# Patient Record
Sex: FEMALE | Race: WHITE | Employment: STUDENT | URBAN - NONMETROPOLITAN AREA
[De-identification: names, ages, dates, MRNs, and addresses within clinical notes are randomized per-mention and may not be internally consistent; named-entity substitution may affect disease eponyms.]

---
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
## 2019-03-02 ENCOUNTER — TRANSCRIBE ORDERS (OUTPATIENT)
Dept: ADMINISTRATIVE | Facility: HOSPITAL | Age: 19
End: 2019-03-02

## 2019-03-02 ENCOUNTER — LAB (OUTPATIENT)
Dept: LAB | Facility: HOSPITAL | Age: 19
End: 2019-03-02

## 2019-03-02 ENCOUNTER — NURSE TRIAGE (OUTPATIENT)
Dept: CALL CENTER | Facility: HOSPITAL | Age: 19
End: 2019-03-02

## 2019-03-02 DIAGNOSIS — R50.9 FEVER, UNSPECIFIED FEVER CAUSE: Primary | ICD-10-CM

## 2019-03-02 DIAGNOSIS — R50.9 FEVER, UNSPECIFIED FEVER CAUSE: ICD-10-CM

## 2019-03-02 LAB
FLUAV AG NPH QL: NEGATIVE
FLUBV AG NPH QL IA: NEGATIVE

## 2019-03-02 PROCEDURE — 87804 INFLUENZA ASSAY W/OPTIC: CPT

## 2019-03-02 NOTE — TELEPHONE ENCOUNTER
"She was seen by the [facility] today. Her medications were to be at the [facility] pharmacy. The medications are not there. She would like the medications sent to [facility].    Reason for Disposition  • [1] Prescription not at pharmacy AND [2] was prescribed today by PCP    Additional Information  • Negative: Drug overdose and nurse unable to answer question  • Negative: Caller requesting information not related to medicine  • Negative: Caller requesting a prescription for Strep throat and has a positive culture result  • Negative: Rash while taking a medication or within 3 days of stopping it  • Negative: Immunization reaction suspected  • Negative: [1] Asthma and [2] having symptoms of asthma (cough, wheezing, etc)  • Negative: MORE THAN A DOUBLE DOSE of a prescription or over-the-counter (OTC) drug  • Negative: [1] DOUBLE DOSE (an extra dose or lesser amount) of over-the-counter (OTC) drug AND [2] any symptoms (e.g., dizziness, nausea, pain, sleepiness)  • Negative: [1] DOUBLE DOSE (an extra dose or lesser amount) of prescription drug AND [2] any symptoms (e.g., dizziness, nausea, pain, sleepiness)  • Negative: Took another person's prescription drug  • Negative: [1] DOUBLE DOSE (an extra dose or lesser amount) of prescription drug AND [2] NO symptoms (Exception: a double dose of antibiotics)  • Negative: Diabetes drug error or overdose (e.g., insulin or extra dose)  • Negative: [1] Request for URGENT new prescription or refill of \"essential\" medication (i.e., likelihood of harm to patient if not taken) AND [2] triager unable to fill per unit policy  • Negative: Pharmacy calling with prescription questions and triager unable to answer question  • Negative: Caller has URGENT medication question about med that PCP prescribed and triager unable to answer question  • Negative: Caller has NON-URGENT medication question about med that PCP prescribed and triager unable to answer question  • Negative: Caller " "requesting a NON-URGENT new prescription or refill and triager unable to refill per unit policy  • Negative: Caller has medication question about med not prescribed by PCP and triager unable to answer question (e.g., compatibility with other med, storage)    Answer Assessment - Initial Assessment Questions  1. SYMPTOMS: \"Do you have any symptoms?\"      Her medications are not at the pharmacy.   2. SEVERITY: If symptoms are present, ask \"Are they mild, moderate or severe?\"      The medications are not at the pharmacy.    Protocols used: MEDICATION QUESTION CALL-ADULT-      "

## 2019-05-22 ENCOUNTER — TELEPHONE (OUTPATIENT)
Dept: FAMILY MEDICINE CLINIC | Age: 19
End: 2019-05-22

## 2019-05-22 NOTE — TELEPHONE ENCOUNTER
Patient left  requesting forms for work showing shes had a physical and a TB test. Left vm for patient to return call and schedule appt for physical and TB test because patient last physical and TB test was a year ago.

## 2019-05-31 ENCOUNTER — CLINICAL SUPPORT (OUTPATIENT)
Dept: RETAIL CLINIC | Facility: CLINIC | Age: 19
End: 2019-05-31

## 2019-05-31 ENCOUNTER — OFFICE VISIT (OUTPATIENT)
Dept: FAMILY MEDICINE CLINIC | Age: 19
End: 2019-05-31
Payer: COMMERCIAL

## 2019-05-31 VITALS
HEART RATE: 57 BPM | OXYGEN SATURATION: 99 % | TEMPERATURE: 98.6 F | DIASTOLIC BLOOD PRESSURE: 70 MMHG | BODY MASS INDEX: 28.47 KG/M2 | SYSTOLIC BLOOD PRESSURE: 120 MMHG | WEIGHT: 190 LBS

## 2019-05-31 DIAGNOSIS — F41.1 GENERALIZED ANXIETY DISORDER: ICD-10-CM

## 2019-05-31 DIAGNOSIS — Z00.00 ENCOUNTER FOR WELL ADULT EXAM WITHOUT ABNORMAL FINDINGS: Primary | ICD-10-CM

## 2019-05-31 DIAGNOSIS — N92.6 IRREGULAR MENSTRUAL CYCLE: ICD-10-CM

## 2019-05-31 DIAGNOSIS — Z11.1 VISIT FOR TB SKIN TEST: Primary | ICD-10-CM

## 2019-05-31 DIAGNOSIS — S93.402A SPRAIN OF LEFT ANKLE, UNSPECIFIED LIGAMENT, INITIAL ENCOUNTER: ICD-10-CM

## 2019-05-31 PROCEDURE — 99395 PREV VISIT EST AGE 18-39: CPT | Performed by: CLINICAL NURSE SPECIALIST

## 2019-05-31 PROCEDURE — 86580 TB INTRADERMAL TEST: CPT | Performed by: NURSE PRACTITIONER

## 2019-05-31 ASSESSMENT — ENCOUNTER SYMPTOMS
WHEEZING: 0
FACIAL SWELLING: 0
COLOR CHANGE: 0
SINUS PRESSURE: 0
CHEST TIGHTNESS: 0
SHORTNESS OF BREATH: 0
EYE DISCHARGE: 0
EYE PAIN: 0
ABDOMINAL PAIN: 0
COUGH: 0
SORE THROAT: 0
EYE REDNESS: 0
BACK PAIN: 0
TROUBLE SWALLOWING: 0
CONSTIPATION: 0
DIARRHEA: 0

## 2019-05-31 NOTE — PROGRESS NOTES
heard.  Pulmonary/Chest: Effort normal and breath sounds normal. No respiratory distress. She has no wheezes. She has no rales. Abdominal: Soft. Bowel sounds are normal. She exhibits no distension. There is no tenderness. Musculoskeletal: Normal range of motion. She exhibits no deformity. Left ankle: She exhibits swelling and ecchymosis. She exhibits normal range of motion, no deformity and normal pulse. Left foot: There is swelling. There is normal range of motion, no tenderness and no deformity. Lymphadenopathy:     She has no cervical adenopathy. Neurological: She is alert and oriented to person, place, and time. No cranial nerve deficit. Skin: Skin is warm and dry. No rash noted. No erythema. Psychiatric: She has a normal mood and affect. Thought content normal.   Vitals reviewed. ASSESSMENT/PLAN:  1. Encounter for well adult exam without abnormal findings  Immunizations UTD  Pap at 20    2. Irregular menstrual cycle  stable    3. Sprain of left ankle, unspecified ligament, initial encounter  No pinpoint pain and is bearing weight  Continue rest, elevation, NSAIDs and compression  To call if any changes for xray    4.  Generalized anxiety disorder  stable          Return in about 1 year (around 5/31/2020) for physical.

## 2019-05-31 NOTE — LETTER
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 There are no communicable diseases. She is up to date on immunizations. /70, Pulse 57, Temp 98.6, Weight 190lb, Height 5'9\"    If you have any questions or concerns, please don't hesitate to call.     Sincerely,        DESIRE Gage

## 2019-11-15 RX ORDER — DROSPIRENONE AND ETHINYL ESTRADIOL 0.02-3(28)
1 KIT ORAL DAILY
Qty: 28 TABLET | Refills: 1 | Status: SHIPPED | OUTPATIENT
Start: 2019-11-15 | End: 2019-12-19 | Stop reason: SDUPTHER

## 2019-12-19 ENCOUNTER — OFFICE VISIT (OUTPATIENT)
Dept: OBGYN | Age: 19
End: 2019-12-19
Payer: COMMERCIAL

## 2019-12-19 VITALS
WEIGHT: 189 LBS | HEIGHT: 69 IN | SYSTOLIC BLOOD PRESSURE: 130 MMHG | DIASTOLIC BLOOD PRESSURE: 77 MMHG | HEART RATE: 72 BPM | BODY MASS INDEX: 27.99 KG/M2

## 2019-12-19 DIAGNOSIS — Z11.3 SCREEN FOR STD (SEXUALLY TRANSMITTED DISEASE): ICD-10-CM

## 2019-12-19 DIAGNOSIS — Z30.41 ENCOUNTER FOR SURVEILLANCE OF CONTRACEPTIVE PILLS: Primary | ICD-10-CM

## 2019-12-19 PROCEDURE — 99213 OFFICE O/P EST LOW 20 MIN: CPT | Performed by: NURSE PRACTITIONER

## 2019-12-19 RX ORDER — DROSPIRENONE AND ETHINYL ESTRADIOL 0.02-3(28)
1 KIT ORAL DAILY
Qty: 28 TABLET | Refills: 1 | Status: SHIPPED | OUTPATIENT
Start: 2019-12-19 | End: 2020-01-09 | Stop reason: SDUPTHER

## 2019-12-20 ENCOUNTER — OFFICE VISIT (OUTPATIENT)
Dept: FAMILY MEDICINE CLINIC | Age: 19
End: 2019-12-20
Payer: COMMERCIAL

## 2019-12-20 VITALS
SYSTOLIC BLOOD PRESSURE: 118 MMHG | OXYGEN SATURATION: 99 % | HEART RATE: 72 BPM | WEIGHT: 190 LBS | DIASTOLIC BLOOD PRESSURE: 86 MMHG | TEMPERATURE: 97.1 F | BODY MASS INDEX: 28.06 KG/M2

## 2019-12-20 DIAGNOSIS — J06.9 BACTERIAL UPPER RESPIRATORY INFECTION: Primary | ICD-10-CM

## 2019-12-20 DIAGNOSIS — B96.89 BACTERIAL UPPER RESPIRATORY INFECTION: Primary | ICD-10-CM

## 2019-12-20 DIAGNOSIS — Z71.84 ENCOUNTER FOR COUNSELING FOR TRAVEL: ICD-10-CM

## 2019-12-20 PROCEDURE — 99213 OFFICE O/P EST LOW 20 MIN: CPT | Performed by: CLINICAL NURSE SPECIALIST

## 2019-12-20 RX ORDER — AZITHROMYCIN 250 MG/1
250 TABLET, FILM COATED ORAL SEE ADMIN INSTRUCTIONS
Qty: 6 TABLET | Refills: 0 | Status: SHIPPED | OUTPATIENT
Start: 2019-12-20 | End: 2019-12-25

## 2019-12-20 RX ORDER — METHYLPREDNISOLONE 4 MG/1
TABLET ORAL
Qty: 21 TABLET | Refills: 0 | Status: SHIPPED | OUTPATIENT
Start: 2019-12-20 | End: 2019-12-26

## 2019-12-20 ASSESSMENT — ENCOUNTER SYMPTOMS
CHEST TIGHTNESS: 1
SORE THROAT: 0
ALLERGIC/IMMUNOLOGIC NEGATIVE: 1
EYE DISCHARGE: 0
EYE PAIN: 0
FACIAL SWELLING: 0
NAUSEA: 0
WHEEZING: 0
RHINORRHEA: 0
CONSTIPATION: 0
GASTROINTESTINAL NEGATIVE: 1
RESPIRATORY NEGATIVE: 1
SINUS PRESSURE: 0
SHORTNESS OF BREATH: 0
EYES NEGATIVE: 1
DIARRHEA: 0
VOMITING: 0
COUGH: 1

## 2019-12-22 LAB
APTIMA MEDIA TYPE: NORMAL
CHLAMYDIA TRACHOMATIS AMPLIFIED DET: NEGATIVE
N GONORRHOEAE AMPLIFIED DET: NEGATIVE
SPECIMEN SOURCE: NORMAL

## 2020-01-08 ENCOUNTER — TELEPHONE (OUTPATIENT)
Dept: FAMILY MEDICINE CLINIC | Age: 20
End: 2020-01-08

## 2020-01-08 NOTE — TELEPHONE ENCOUNTER
Has she tried cutting in 1/2, if not would recommend 1/2 tablet up to three times daily as needed  If she has, I can send alternative

## 2020-01-09 RX ORDER — HYDROXYZINE PAMOATE 25 MG/1
25 CAPSULE ORAL 3 TIMES DAILY PRN
Qty: 60 CAPSULE | Refills: 0 | Status: SHIPPED | OUTPATIENT
Start: 2020-01-09 | End: 2022-01-18

## 2020-01-09 RX ORDER — DROSPIRENONE AND ETHINYL ESTRADIOL 0.02-3(28)
1 KIT ORAL DAILY
Qty: 28 TABLET | Refills: 1 | Status: SHIPPED | OUTPATIENT
Start: 2020-01-09 | End: 2020-04-28

## 2020-01-09 NOTE — TELEPHONE ENCOUNTER
Refill on OCP sent     For anxiety sent generic vistaril for prn use. This is really the only other thing for as needed use. If this does not work, will need to discuss other options in office.

## 2020-04-06 RX ORDER — FLUTICASONE PROPIONATE 50 MCG
2 SPRAY, SUSPENSION (ML) NASAL DAILY
Qty: 1 BOTTLE | Refills: 3 | Status: SHIPPED | OUTPATIENT
Start: 2020-04-06 | End: 2020-12-29

## 2020-04-28 ENCOUNTER — TELEMEDICINE (OUTPATIENT)
Dept: FAMILY MEDICINE CLINIC | Age: 20
End: 2020-04-28
Payer: COMMERCIAL

## 2020-04-28 ENCOUNTER — NURSE TRIAGE (OUTPATIENT)
Dept: OTHER | Facility: CLINIC | Age: 20
End: 2020-04-28

## 2020-04-28 VITALS
BODY MASS INDEX: 28.14 KG/M2 | DIASTOLIC BLOOD PRESSURE: 80 MMHG | WEIGHT: 190 LBS | SYSTOLIC BLOOD PRESSURE: 120 MMHG | HEIGHT: 69 IN | HEART RATE: 84 BPM

## 2020-04-28 PROCEDURE — 99213 OFFICE O/P EST LOW 20 MIN: CPT | Performed by: CLINICAL NURSE SPECIALIST

## 2020-04-28 RX ORDER — METHYLPREDNISOLONE 4 MG/1
TABLET ORAL
Qty: 21 TABLET | Refills: 0 | Status: SHIPPED | OUTPATIENT
Start: 2020-04-28 | End: 2020-05-04

## 2020-04-28 RX ORDER — AZITHROMYCIN 250 MG/1
TABLET, FILM COATED ORAL
Qty: 1 PACKET | Refills: 0 | Status: SHIPPED | OUTPATIENT
Start: 2020-04-28 | End: 2020-06-02

## 2020-04-28 ASSESSMENT — ENCOUNTER SYMPTOMS
SHORTNESS OF BREATH: 1
SORE THROAT: 0
EYE PAIN: 0
CHEST TIGHTNESS: 1
EYE DISCHARGE: 0
COUGH: 1
FACIAL SWELLING: 0
WHEEZING: 0
RHINORRHEA: 0
NAUSEA: 0
SINUS PRESSURE: 0
VOMITING: 0

## 2020-04-28 NOTE — PROGRESS NOTES
SUBJECTIVE:  Haroon Mckeon is a 23 y.o. who presents today for Cough      HPI    VIRTUAL VISIT VIA TELEPHONE    _______________________________________________________________    Due to COVID 23 outbreak, patient's office visit was converted to telephone virtual visit. Patient was contacted and agreed to proceed with a telephone virtual visit. The risks and benefits of converting to a telephone virtual visit were discussed in light of the current infectious disease epidemic. Patient also understood that insurance coverage and co-pays are up to their individual insurance plans. Katrin Smith presents today with increased cough and shortness of breath in the past week. She was diagnosed with asthma as a child and has done well, but she feels like since being in college, living in dorm with mold she has been more sick. She has albuterol rescue inhaler, but now feels like ineffective. In the past week worse. She has increased nasopharyngeal mucous and chest congestion. She is unable to expectorate any of this. No fever or chills. No known wheezing. She is taking regular antihistamine and nasal spray as well. Past Medical History:   Diagnosis Date    Asthma     Generalized anxiety disorder     Insomnia      No past surgical history on file.   Family History   Problem Relation Age of Onset    Diabetes Father     Heart Failure Maternal Grandmother     Heart Failure Maternal Grandfather     Heart Failure Paternal Grandmother     Stroke Paternal Grandmother     Diabetes Paternal Grandmother     Heart Failure Paternal Grandfather      Social History     Tobacco Use    Smoking status: Never Smoker    Smokeless tobacco: Never Used   Substance Use Topics    Alcohol use: No     Current Outpatient Medications   Medication Sig Dispense Refill    drospirenone-ethinyl estradiol (ELVIS) 3-0.02 MG per tablet Take 1 tablet by mouth daily 28 tablet 5    methylPREDNISolone (MEDROL DOSEPACK) 4 MG tablet Take by mouth. 21 tablet 0    albuterol-ipratropium (COMBIVENT RESPIMAT)  MCG/ACT AERS inhaler Inhale 1 puff into the lungs every 6 hours as needed for Wheezing or Shortness of Breath 4 g 5    azithromycin (ZITHROMAX) 250 MG tablet Take 2 tabs (500 mg) on Day 1, and take 1 tab (250 mg) on days 2 through 5. 1 packet 0    fluticasone (FLONASE) 50 MCG/ACT nasal spray 2 sprays by Nasal route daily 1 Bottle 3    hydrOXYzine (VISTARIL) 25 MG capsule Take 1 capsule by mouth 3 times daily as needed for Anxiety 60 capsule 0    cetirizine (ZYRTEC) 10 MG tablet Take 10 mg by mouth as needed for Allergies       No current facility-administered medications for this visit. No Known Allergies    Review of Systems   Constitutional: Negative for appetite change, chills, fatigue and fever. HENT: Positive for congestion and postnasal drip. Negative for ear discharge, ear pain, facial swelling, hearing loss, rhinorrhea, sinus pressure and sore throat. Eyes: Negative for pain, discharge and visual disturbance. Respiratory: Positive for cough, chest tightness and shortness of breath. Negative for wheezing. Cardiovascular: Negative for chest pain. Gastrointestinal: Negative for nausea and vomiting. Genitourinary: Negative for dysuria, frequency and urgency. Musculoskeletal: Negative for arthralgias and myalgias. Neurological: Negative for weakness, light-headedness and headaches. OBJECTIVE:  /80   Pulse 84   Ht 5' 9\" (1.753 m)   Wt 190 lb (86.2 kg)   BMI 28.06 kg/m²    Physical Exam  Vitals signs reviewed. Constitutional:       General: She is not in acute distress. Appearance: Normal appearance. She is not ill-appearing or toxic-appearing. HENT:      Nose: Congestion present. Eyes:      Conjunctiva/sclera: Conjunctivae normal.   Neck:      Musculoskeletal: Normal range of motion. Pulmonary:      Effort: Pulmonary effort is normal. No respiratory distress. Breath sounds:  No

## 2020-04-29 ENCOUNTER — TELEPHONE (OUTPATIENT)
Dept: FAMILY MEDICINE CLINIC | Age: 20
End: 2020-04-29

## 2020-04-29 RX ORDER — ALBUTEROL SULFATE 90 UG/1
2 AEROSOL, METERED RESPIRATORY (INHALATION) 4 TIMES DAILY PRN
Qty: 1 INHALER | Refills: 5 | Status: SHIPPED | OUTPATIENT
Start: 2020-04-29

## 2020-06-02 ENCOUNTER — OFFICE VISIT (OUTPATIENT)
Dept: FAMILY MEDICINE CLINIC | Age: 20
End: 2020-06-02
Payer: COMMERCIAL

## 2020-06-02 VITALS
RESPIRATION RATE: 18 BRPM | WEIGHT: 191 LBS | DIASTOLIC BLOOD PRESSURE: 74 MMHG | SYSTOLIC BLOOD PRESSURE: 116 MMHG | OXYGEN SATURATION: 98 % | HEIGHT: 68 IN | HEART RATE: 71 BPM | BODY MASS INDEX: 28.95 KG/M2 | TEMPERATURE: 97.4 F

## 2020-06-02 PROCEDURE — 99395 PREV VISIT EST AGE 18-39: CPT | Performed by: CLINICAL NURSE SPECIALIST

## 2020-06-03 ASSESSMENT — ENCOUNTER SYMPTOMS
BACK PAIN: 0
CONSTIPATION: 0
SORE THROAT: 0
EYE REDNESS: 0
ABDOMINAL DISTENTION: 0
ABDOMINAL PAIN: 0
WHEEZING: 0
SINUS PRESSURE: 0
CHEST TIGHTNESS: 0
EYE DISCHARGE: 0
FACIAL SWELLING: 0
TROUBLE SWALLOWING: 0
EYE PAIN: 0
SHORTNESS OF BREATH: 0
DIARRHEA: 1
COLOR CHANGE: 0
COUGH: 0

## 2020-07-22 ENCOUNTER — VIRTUAL VISIT (OUTPATIENT)
Dept: FAMILY MEDICINE CLINIC | Age: 20
End: 2020-07-22
Payer: COMMERCIAL

## 2020-07-22 PROCEDURE — 99214 OFFICE O/P EST MOD 30 MIN: CPT | Performed by: CLINICAL NURSE SPECIALIST

## 2020-07-22 ASSESSMENT — ENCOUNTER SYMPTOMS
ABDOMINAL PAIN: 0
EYE DISCHARGE: 0
EYE REDNESS: 0
CHEST TIGHTNESS: 0
TROUBLE SWALLOWING: 0
DIARRHEA: 0
CONSTIPATION: 0
FACIAL SWELLING: 0
WHEEZING: 0
BACK PAIN: 0
SHORTNESS OF BREATH: 0
EYE PAIN: 0
SORE THROAT: 0
COLOR CHANGE: 0
SINUS PRESSURE: 0
COUGH: 0

## 2020-07-22 NOTE — PROGRESS NOTES
SUBJECTIVE:  Armando Canales is a 23 y.o. who presents today for Contraception and Referral - General (ENT)      HPI    VIRTUAL VISIT VIA TELEPHONE    _______________________________________________________________    Due to COVID 23 outbreak, patient's office visit was converted to telephone virtual visit. Patient was contacted and agreed to proceed with a telephone virtual visit. The risks and benefits of converting to a telephone virtual visit were discussed in light of the current infectious disease epidemic. Patient also understood that insurance coverage and co-pays are up to their individual insurance plans. Camella Ganser was evaluated today via doxy. me for acute needs. She seems to have chronic, recurrent sinusitis. At first, seemed to be triggered by allergies to mold in her dorm at school, but now seems to also be with any weather pattern or season change. She uses steroid nasal spray and oral antihistamines, but really is interested in further evaluation and treatment of this. She prefers Dr Harpreet Morales at [facility]. Camella Ganser has had irregular menses as well as amenorrhea in the past.  She was started on OCP to help regulate this, but then went months without a period. After blood work, she was found to have elevated testosterone and was placed on irish per another provider/GYN. Since starting irish she has gained 30lb. She does not really feel like diet has changed and actually feels like overall has been more active. She tolerates the irish fine otherwise, but is interested in something with lower dosage hormones. Has not had a pap yet, but is scheduled in January with GYN    Past Medical History:   Diagnosis Date    Asthma     Generalized anxiety disorder     Insomnia      History reviewed. No pertinent surgical history.   Family History   Problem Relation Age of Onset    Diabetes Father     Heart Failure Maternal Grandmother     Heart Failure Maternal Grandfather     Heart Failure Paternal Grandmother     Stroke Paternal Grandmother     Diabetes Paternal Grandmother     Heart Failure Paternal Grandfather      Social History     Tobacco Use    Smoking status: Never Smoker    Smokeless tobacco: Never Used   Substance Use Topics    Alcohol use: No     Current Outpatient Medications   Medication Sig Dispense Refill    norethindrone-ethinyl estradiol-Fe (LO LOESTRIN FE) 1 MG-10 MCG / 10 MCG tablet Take 1 tablet by mouth daily 84 tablet 3    albuterol sulfate HFA (VENTOLIN HFA) 108 (90 Base) MCG/ACT inhaler Inhale 2 puffs into the lungs 4 times daily as needed for Wheezing 1 Inhaler 5    fluticasone (FLONASE) 50 MCG/ACT nasal spray 2 sprays by Nasal route daily 1 Bottle 3    hydrOXYzine (VISTARIL) 25 MG capsule Take 1 capsule by mouth 3 times daily as needed for Anxiety 60 capsule 0    cetirizine (ZYRTEC) 10 MG tablet Take 10 mg by mouth as needed for Allergies       No current facility-administered medications for this visit. No Known Allergies    Review of Systems   Constitutional: Positive for unexpected weight change. Negative for appetite change, chills, diaphoresis, fatigue and fever. HENT: Negative for congestion, ear pain, facial swelling, hearing loss, postnasal drip, sinus pressure, sore throat and trouble swallowing. Eyes: Negative for pain, discharge, redness and visual disturbance. Respiratory: Negative for cough, chest tightness, shortness of breath and wheezing. Cardiovascular: Negative for chest pain and palpitations. Gastrointestinal: Negative for abdominal pain, constipation and diarrhea. Genitourinary: Positive for menstrual problem. Negative for difficulty urinating, dysuria, flank pain, frequency, hematuria and urgency. Musculoskeletal: Negative for arthralgias, back pain, joint swelling and neck pain. Skin: Negative for color change and rash. Neurological: Negative for dizziness, syncope, weakness, light-headedness and headaches.    Hematological: Negative for adenopathy. Psychiatric/Behavioral: Negative for confusion, dysphoric mood and suicidal ideas. The patient is not nervous/anxious. OBJECTIVE:  Patient-Reported Vitals 7/22/2020   Patient-Reported Systolic 059   Patient-Reported Diastolic 59   Patient-Reported Pulse 96        There were no vitals taken for this visit. Physical Exam  Vitals signs reviewed. Constitutional:       General: She is not in acute distress. Appearance: She is not ill-appearing or toxic-appearing. HENT:      Head: Normocephalic. Nose: No congestion. Neck:      Musculoskeletal: Normal range of motion. Pulmonary:      Effort: Pulmonary effort is normal. No respiratory distress. Breath sounds: No wheezing. Neurological:      General: No focal deficit present. Mental Status: She is alert and oriented to person, place, and time. Mental status is at baseline. Psychiatric:         Mood and Affect: Mood normal.         Behavior: Behavior normal.         Thought Content: Thought content normal.         Judgment: Judgment normal.         ASSESSMENT/PLAN:  1. Irregular menstrual cycle  Controlled but side effects to irish including weight gain  She is coming up on the end of current pack, will start lo-estrin for next cycle  Advised of side effects  Pap smear as planned in January   - norethindrone-ethinyl estradiol-Fe (LO LOESTRIN FE) 1 MG-10 MCG / 10 MCG tablet; Take 1 tablet by mouth daily  Dispense: 84 tablet; Refill: 3    2. Recurrent sinusitis  - External Referral To ENT    Greater than 50% of 25 minute visit was spent in direct coordination with the patient for MDM. Return for already scheduled.

## 2020-07-30 ENCOUNTER — TELEPHONE (OUTPATIENT)
Dept: FAMILY MEDICINE CLINIC | Age: 20
End: 2020-07-30

## 2020-07-30 NOTE — TELEPHONE ENCOUNTER
She most likely does not need a referral.  I would have her check with [facility] to see if a therapist there can help her. She can call.

## 2020-07-30 NOTE — TELEPHONE ENCOUNTER
Patient is requesting to get a letter for an emotional support animal. Patient is not currently a patient with psych. Would you like to refer this patient? Please advise.

## 2020-08-05 ENCOUNTER — TRANSCRIBE ORDERS (OUTPATIENT)
Dept: ADMINISTRATIVE | Facility: HOSPITAL | Age: 20
End: 2020-08-05

## 2020-08-05 DIAGNOSIS — Z01.818 PREOP TESTING: Primary | ICD-10-CM

## 2020-08-28 ENCOUNTER — OFFICE VISIT (OUTPATIENT)
Dept: OTOLARYNGOLOGY | Facility: CLINIC | Age: 20
End: 2020-08-28

## 2020-08-28 VITALS
DIASTOLIC BLOOD PRESSURE: 83 MMHG | BODY MASS INDEX: 28.76 KG/M2 | WEIGHT: 194.2 LBS | SYSTOLIC BLOOD PRESSURE: 143 MMHG | HEART RATE: 69 BPM | HEIGHT: 69 IN | TEMPERATURE: 97.8 F

## 2020-08-28 DIAGNOSIS — J32.9 CHRONIC SINUSITIS, UNSPECIFIED LOCATION: ICD-10-CM

## 2020-08-28 DIAGNOSIS — J30.9 ALLERGIC RHINITIS, UNSPECIFIED SEASONALITY, UNSPECIFIED TRIGGER: Primary | ICD-10-CM

## 2020-08-28 DIAGNOSIS — J01.91 ACUTE RECURRENT SINUSITIS, UNSPECIFIED LOCATION: ICD-10-CM

## 2020-08-28 PROCEDURE — 99214 OFFICE O/P EST MOD 30 MIN: CPT | Performed by: PHYSICIAN ASSISTANT

## 2020-08-28 RX ORDER — OLOPATADINE HYDROCHLORIDE 665 UG/1
2 SPRAY NASAL 2 TIMES DAILY
Qty: 1 BOTTLE | Refills: 11 | Status: SHIPPED | OUTPATIENT
Start: 2020-08-28 | End: 2021-12-07

## 2020-08-28 RX ORDER — FLUTICASONE PROPIONATE 50 MCG
SPRAY, SUSPENSION (ML) NASAL
COMMUNITY
Start: 2020-08-11 | End: 2020-12-08

## 2020-08-28 RX ORDER — MONTELUKAST SODIUM 10 MG/1
10 TABLET ORAL DAILY
Qty: 30 TABLET | Refills: 11 | Status: SHIPPED | OUTPATIENT
Start: 2020-08-28 | End: 2021-12-07

## 2020-08-28 RX ORDER — ALBUTEROL SULFATE 90 UG/1
2 AEROSOL, METERED RESPIRATORY (INHALATION) EVERY 6 HOURS PRN
COMMUNITY
Start: 2020-04-29 | End: 2021-12-14 | Stop reason: SDUPTHER

## 2020-08-28 RX ORDER — HYDROXYZINE PAMOATE 25 MG/1
25 CAPSULE ORAL 3 TIMES DAILY PRN
COMMUNITY
Start: 2020-01-09 | End: 2021-08-12

## 2020-08-28 RX ORDER — CETIRIZINE HYDROCHLORIDE 10 MG/1
10 TABLET ORAL
COMMUNITY
End: 2020-08-28

## 2020-08-28 RX ORDER — NORETHINDRONE ACETATE AND ETHINYL ESTRADIOL, ETHINYL ESTRADIOL AND FERROUS FUMARATE 1MG-10(24)
1 KIT ORAL DAILY
COMMUNITY
Start: 2020-07-22 | End: 2022-08-17

## 2020-10-02 ENCOUNTER — PROCEDURE VISIT (OUTPATIENT)
Dept: OTOLARYNGOLOGY | Facility: CLINIC | Age: 20
End: 2020-10-02

## 2020-10-02 ENCOUNTER — LAB (OUTPATIENT)
Dept: LAB | Facility: HOSPITAL | Age: 20
End: 2020-10-02

## 2020-10-02 DIAGNOSIS — J30.9 ALLERGIC RHINITIS, UNSPECIFIED SEASONALITY, UNSPECIFIED TRIGGER: Primary | ICD-10-CM

## 2020-10-02 DIAGNOSIS — J30.9 ALLERGIC RHINITIS, UNSPECIFIED SEASONALITY, UNSPECIFIED TRIGGER: ICD-10-CM

## 2020-10-02 PROCEDURE — 86003 ALLG SPEC IGE CRUDE XTRC EA: CPT | Performed by: PHYSICIAN ASSISTANT

## 2020-10-02 PROCEDURE — 95004 PERQ TESTS W/ALRGNC XTRCS: CPT | Performed by: PHYSICIAN ASSISTANT

## 2020-10-02 PROCEDURE — 36415 COLL VENOUS BLD VENIPUNCTURE: CPT

## 2020-10-02 NOTE — PROGRESS NOTES
I have reviewed the notes, assessments, and/or procedures performed by Fransico Perkins, I concur with her/his documentation of Caprice Thibodeaux.

## 2020-10-02 NOTE — PROGRESS NOTES
Fransico Perkins   Allergy Testing Note:    Allergy Impact:  Allergy symptom severity: variable  Allergy symptom frequency: seasonal  Season allergy symptoms are worse: year round  Does allergy symptoms interfere with life?: moderately  Does allergy symptoms interfere with sleep?: a little      Allergy Symptoms:  Nasal symptoms: congestion  Ocular symptoms: itching;tearing  Ear symptoms: none  Throat symptoms: none  Mouth symptoms: none  Chest symptoms: asthma as a child      Environmental History:  Occupation: Student  Home environment: hardwood floor;plants  Tobacco use: none  Tobacco use: none  Animal exposures: cats;dogs (x 1 Cat indoor/x 1 Dog indoor)  Home heating: electric  Home cooling: central air      Allergy History:  Insect allergy reactions: bee  Previous allergy testing?: no  Previous immunotherapy?: no  Previous treatment in ER for allergic reaction?: no      Allergy Skin Testing:  Allergy testing was performed using the Modified Quantitative Technique. The procedure of allergy testing was explained to the patient including risks of itching burning and reactions both local and systemic. The patient understood and signed a consent. Alcohol prep was used to prepare the skin and a marking pen was used to label the Multi- Test and intradermal panels. Prick testing was performed on the forearms by using the Multitest applicator and applying gentle pressure. After 15 minutes the panels were read for reactions. Intradermal testing follow ups were then performed on the forearms. After 15 minutes, the panels were read for reactions. The results were explained to the patient and questions answered. No complications were noted.    Allergy Testing Results:  Consent: Y    Testing location: Arm    Allergen : Monreal    Testing Nurse/Tech: Fransico Perkins ST/AT    Reviewing Physician: David Snyder    Testing method: Skin Testing      Endpoints: 0=negative, higher number=higher reaction:  Vial: 3= sublingual  vial  Antigen Prick 5 2 EP VIAL    Histamine (!) 0       NEGATIVE HISTAMINE CONTROL     Glycerine Control 0          Eastern Tree Mix(T) 0        0        Black Dinosaur (T) 0       0        Bermuda Grass (G) 0       0        Narciso Grass (G) 0       0        [place] Bluegrass (G) 0       0        Ragweed Mix (W) 0       0        Common Weed (W) 0       0        Deisy Weed(W) 0       0        Mugwort/ Orestes (W) 0       0        Mold Mix (M) 0        0        Phycomycetes (M) 0       0        Fusarium (M) 0       0        Epicoccum (M) 0       0        Candida (M) 0       0        Trichophyton (M) 0       0        Phoma  (M) 0       0        Dust Mite Mix  0       0        Cockroach  0       0        Dog 0       0        Cat 0       0        Horse 0       0        Feather 0       0          Fransico RODRIGUEZ Perkins  10/2/2020  09:54 CDT     *Patient had negative Histamine response with all other antigen bring negative.  Proceeded with ImmnoCAP testing for environmental and standard food panels per Maxi Breen PA-C.

## 2020-10-05 LAB
A ALTERNATA IGE QN: <0.1 KU/L
A FUMIGATUS IGE QN: <0.1 KU/L
AMER ROACH IGE QN: <0.1 KU/L
BERMUDA GRASS IGE QN: <0.1 KU/L
BOXELDER IGE QN: <0.1 KU/L
C ALBICANS IGE QN: <0.1 KU/L
C HERBARUM IGE QN: <0.1 KU/L
CARELESS WEED IGE QN: <0.1 KU/L
CAT DANDER IGE QN: <0.1 KU/L
COCKLEBUR IGE QN: <0.1 KU/L
COMMON RAGWEED IGE QN: <0.1 KU/L
CONV CLASS DESCRIPTION: NORMAL
COW DANDER IGE QN: <0.1 KU/L
CURVULARIA IGE QN: <0.1 KU/L
D FARINAE IGE QN: <0.1 KU/L
D PTERONYSS IGE QN: <0.1 KU/L
DOG DANDER IGE QN: <0.1 KU/L
EAST WHITE PINE IGE QN: <0.1 KU/L
ENGL PLANTAIN IGE QN: <0.1 KU/L
GOOSE FEATHER IGE QN: <0.1 KU/L
GOOSEFOOT IGE QN: <0.1 KU/L
HORSE EPITH IGE QN: <0.1 KU/L
HOUSE DUST HS IGE QN: <0.1 KU/L
JOHNSON GRASS IGE QN: <0.1 KU/L
KENT BLUE GRASS IGE QN: <0.1 KU/L
LONDON PLANE IGE QN: <0.1 KU/L
MT JUNIPER IGE QN: <0.1 KU/L
P NOTATUM IGE QN: <0.1 KU/L
S ROSTRATA IGE QN: <0.1 KU/L
SHEEP SORREL IGE QN: <0.1 KU/L
VIRG LIVE OAK IGE QN: <0.1 KU/L
WHITE ASH IGE QN: <0.1 KU/L
WHITE ELM IGE QN: <0.1 KU/L
WHITE HICKORY IGE QN: <0.1 KU/L

## 2020-10-06 ENCOUNTER — TELEPHONE (OUTPATIENT)
Dept: OTOLARYNGOLOGY | Facility: CLINIC | Age: 20
End: 2020-10-06

## 2020-10-06 LAB
ALMOND IGE QN: <0.1 KU/L
BARLEY IGE QN: <0.1 KU/L
BEEF IGE QN: <0.1 KU/L
BRAZIL NUT IGE QN: <0.1 KU/L
CARROT IGE QN: <0.1 KU/L
CASHEW NUT IGE QN: <0.1 KU/L
CHICKEN MEAT IGE QN: <0.1 KU/L
COCONUT IGE QN: <0.1 KU/L
CODFISH IGE QN: <0.1 KU/L
CONV CLASS DESCRIPTION: NORMAL
CORN IGE QN: <0.1 KU/L
COW MILK IGE QN: <0.1 KU/L
CRAB IGE QN: <0.1 KU/L
EGG WHITE IGE QN: <0.1 KU/L
EGG YOLK IGE QN: <0.1 KU/L
GARLIC IGE QN: <0.1 KU/L
GLUTEN IGE QN: <0.1 KU/L
GOAT MILK IGE QN: <0.1 KU/L
HAZELNUT IGE QN: <0.1 KU/L
LOBSTER IGE QN: <0.1 KU/L
MACADAMIA IGE QN: <0.1 KU/L
ORANGE IGE QN: <0.1 KU/L
OYSTER IGE QN: <0.1 KU/L
PEA IGE QN: <0.1 KU/L
PEANUT IGE QN: <0.1 KU/L
PECAN/HICK NUT IGE QN: <0.1 KU/L
PISTACHIO IGE QN: <0.1 KU/L
PORK IGE QN: <0.1 KU/L
POTATO IGE QN: <0.1 KU/L
RICE IGE QN: <0.1 KU/L
RYE IGE QN: <0.1 KU/L
SALMON IGE QN: <0.1 KU/L
SCALLOP IGE QN: <0.1 KU/L
SESAME SEED IGE QN: <0.1 KU/L
SHRIMP IGE QN: <0.1 KU/L
SOYBEAN IGE QN: <0.1 KU/L
STRAWBERRY IGE QN: <0.1 KU/L
TOMATO IGE QN: <0.1 KU/L
TROUT IGE QN: <0.1 KU/L
TUNA IGE QN: <0.1 KU/L
WALNUT IGE QN: <0.1 KU/L
WHEAT IGE QN: <0.1 KU/L
WHITE BEAN IGE QN: <0.1 KU/L

## 2020-10-06 NOTE — TELEPHONE ENCOUNTER
Patient Notified    ----- Message from QUINN Girard sent at 10/6/2020 10:43 AM CDT -----  Please call the patient regarding her normal result.

## 2020-10-16 ENCOUNTER — OFFICE VISIT (OUTPATIENT)
Dept: OTOLARYNGOLOGY | Facility: CLINIC | Age: 20
End: 2020-10-16

## 2020-10-16 ENCOUNTER — TELEPHONE (OUTPATIENT)
Dept: OTOLARYNGOLOGY | Facility: CLINIC | Age: 20
End: 2020-10-16

## 2020-10-16 DIAGNOSIS — J30.9 ALLERGIC RHINITIS, UNSPECIFIED SEASONALITY, UNSPECIFIED TRIGGER: Primary | ICD-10-CM

## 2020-10-16 DIAGNOSIS — J32.9 CHRONIC SINUSITIS, UNSPECIFIED LOCATION: ICD-10-CM

## 2020-10-16 DIAGNOSIS — J01.91 ACUTE RECURRENT SINUSITIS, UNSPECIFIED LOCATION: ICD-10-CM

## 2020-10-16 PROCEDURE — 99421 OL DIG E/M SVC 5-10 MIN: CPT | Performed by: PHYSICIAN ASSISTANT

## 2020-10-16 NOTE — TELEPHONE ENCOUNTER
Called pt to reschedule allergy testing and follow up appt.  Advised pt to stop ALL meds 7 days prior to allergy testing except birth control and albuterol      ----- Message from QUINN Girard sent at 10/16/2020  1:54 PM CDT -----  NEED TO SCHEDULE REPEAT ALLERGY TESTING WITH SACHA WITH A FOLLOW-UP APPOINTMENT AFTER. SHE WAS TAKING THE PATANASE AND SINGULAIR PRIOR TO TESTING, SO FOR THE REPEAT TEST SHE WILL BE OFF OF ALL MEDICATIONS EXCEPT HER BIRTH CONTROL AND ALBUTEROL

## 2020-11-23 ENCOUNTER — PROCEDURE VISIT (OUTPATIENT)
Dept: OTOLARYNGOLOGY | Facility: CLINIC | Age: 20
End: 2020-11-23

## 2020-11-23 ENCOUNTER — HOSPITAL ENCOUNTER (OUTPATIENT)
Dept: CT IMAGING | Facility: HOSPITAL | Age: 20
Discharge: HOME | End: 2020-11-23
Admitting: PHYSICIAN ASSISTANT

## 2020-11-23 DIAGNOSIS — J01.91 ACUTE RECURRENT SINUSITIS, UNSPECIFIED LOCATION: ICD-10-CM

## 2020-11-23 DIAGNOSIS — J32.9 CHRONIC SINUSITIS, UNSPECIFIED LOCATION: ICD-10-CM

## 2020-11-23 DIAGNOSIS — J30.9 ALLERGIC RHINITIS, UNSPECIFIED SEASONALITY, UNSPECIFIED TRIGGER: ICD-10-CM

## 2020-11-23 DIAGNOSIS — J30.9 ALLERGIC RHINITIS, UNSPECIFIED SEASONALITY, UNSPECIFIED TRIGGER: Primary | ICD-10-CM

## 2020-11-23 PROCEDURE — 95024 IQ TESTS W/ALLERGENIC XTRCS: CPT | Performed by: NURSE PRACTITIONER

## 2020-11-23 PROCEDURE — 95004 PERQ TESTS W/ALRGNC XTRCS: CPT | Performed by: NURSE PRACTITIONER

## 2020-11-23 PROCEDURE — 70486 CT MAXILLOFACIAL W/O DYE: CPT

## 2020-11-23 NOTE — PROGRESS NOTES
Fransico Perkins   Allergy Testing Note:    Allergy Impact:  Allergy symptom severity: variable  Allergy symptom frequency: seasonal  Season allergy symptoms are worse: year round  Does allergy symptoms interfere with life?: moderately  Does allergy symptoms interfere with sleep?: a little      Allergy Symptoms:  Nasal symptoms: congestion  Ocular symptoms: itching;tearing  Ear symptoms: none  Throat symptoms: none  Mouth symptoms: none  Chest symptoms: asthma as a child      Environmental History:  Occupation: Student  Home environment: hardwood floor;plants  Tobacco use: none  Tobacco use: none  Animal exposures: cats;dogs (x 1 Cat/Indoor/x 1 Dog indoor)  Home heating: electric  Home cooling: central air      Allergy History:  Insect allergy reactions: bee  Previous allergy testing?: yes  When was previous allergy testing?: 10/2/2020 with Negative Histamine and proceeded with ImmunoCap testing with negative results.  Previous allergy doctor: David Snyder  Previous immunotherapy?: no  Previous treatment in ER for allergic reaction?: no      Allergy Skin Testing:  Allergy testing was performed using the Modified Quantitative Technique. The procedure of allergy testing was explained to the patient including risks of itching burning and reactions both local and systemic. The patient understood and signed a consent. Alcohol prep was used to prepare the skin and a marking pen was used to label the Multi- Test and intradermal panels. Prick testing was performed on the forearms by using the Multitest applicator and applying gentle pressure. After 15 minutes the panels were read for reactions. Intradermal testing follow ups were then performed on the forearms. After 15 minutes, the panels were read for reactions. The results were explained to the patient and questions answered. No complications were noted.    Allergy Testing Results:  Consent: Y    Testing location: Arm    Allergen : Monreal    Testing Nurse/Tech:  Fransico Perkins ST/AT    Reviewing Physician: David Snyder    Testing method: Skin Testing      Endpoints: 0=negative, higher number=higher reaction:  Vial: 3= sublingual vial  Antigen Prick 5 2 EP VIAL    Histamine 7       normal controls     Glycerine Control 0          Eastern Tree Mix(T) 0      7   3        [place] (T) 0     7   3        Berm[place] Grass (G) 0     6   0        Narciso Grass (G) 0     7   3        [place] Bluegrass (G) 0     7   3        Ragweed Mix (W) 0     7   3        Common Weed (W) 0     6   0        Deisy Weed(W) 0     6   0        Mugwort/ Orestes (W) 0     6   0        Mold Mix (M) 0      9   3        Phycomycetes (M) 0     7   3        Fusarium (M) 0     9   3        Epicoccum (M) 0     6   0        Candida (M) 0     11   3        Trichophyton (M) 0     9   3        Phoma  (M) 0     7   3        Dust Mite Mix  0     11   3        Cockroach  0     6   0        Dog 0     7   3        Cat 0     6   0        Horse 0     6   0        Feather 0     6   0          Fransico Perkins  11/23/2020  14:53 CST

## 2020-11-30 ENCOUNTER — TELEPHONE (OUTPATIENT)
Dept: OTOLARYNGOLOGY | Facility: CLINIC | Age: 20
End: 2020-11-30

## 2020-11-30 NOTE — TELEPHONE ENCOUNTER
----- Message from QUINN Girard sent at 11/25/2020  3:02 PM CST -----  Please call the patient regarding her abnormal result. Mucosal thickening in all sinuses with worst area being in the ethmoid sinuses, right deviation and septal spur, bilateral bib bullosa, hypertrophy of inferior turbinates, narrow bilateral OMC's. Allergy testing was mild. Follow-up to discuss sinus surgery.

## 2020-12-01 ENCOUNTER — TRANSCRIBE ORDERS (OUTPATIENT)
Dept: ADMINISTRATIVE | Facility: HOSPITAL | Age: 20
End: 2020-12-01

## 2020-12-01 DIAGNOSIS — Z01.818 PRE-OP TESTING: Primary | ICD-10-CM

## 2020-12-07 NOTE — PROGRESS NOTES
YOB: 2000  Location: [facility]  Location Address: [address] [place] [address],  [address], [address] [place] [address]  Location Phone: [phone]    Chief Complaint   Patient presents with   • Follow-up       History of Present Illness  Caprice Thibodeaux is a  20 y.o.  female who complains of nasal congestion, nasal drainage, repeated sinusitis, sinus pressure, postnasal drip, allergy, facial pressure and headaches. The symptoms are localized to the bilateral nose. The patient has had moderate symptoms. The symptoms have been chronically occuring with acute flair ups occurring several times for the last year for the last several years. The symptoms are aggravated by  allergy and weather change. The symptoms are improved by no identifiable factors.The patient has continued sinus symptoms with continued recurrent sinus infections despite being treated with antihistamines, several rounds of antibiotics, and daily use of Flonase, Patanase, and Singulair. Patient has had recent allergy testing and CT sinus.     I have personally reviewed the information imported into the chart during this visit.      I have personally reviewed the review of systems.               Histamine 7            normal controls      Glycerine Control 0                Eastern Tree Mix(T) 0     7    3         Black Grubbs (T) 0     7    3         Bermuda Grass (G) 0     6    0         Narciso Grass (G) 0     7    3         [place] Bluegrass (G) 0     7    3         Ragweed Mix (W) 0     7    3         Common Weed (W) 0     6    0         Deisy Weed(W) 0     6    0         Mugwort/ Orestes (W) 0     6    0         Mold Mix (M) 0     9    3         Phycomycetes (M) 0     7    3         Fusarium (M) 0     9    3         Epicoccum (M) 0     6    0         Candida (M) 0     11    3         Trichophyton (M) 0     9    3         Phoma  (M) 0     7    3         Dust Mite Mix  0     11    3         Cockroach  0     6    0         Dog 0     7    3         Cat 0      6    0         Horse 0     6    0         Feather 0     6    0            Study Result    CT SINUS WO CONTRAST- 11/23/2020 4:05 PM CST     HISTORY: chronic recurrent acute sinusitis despite treatment, imaging  for surgical planning; J30.9-Allergic rhinitis, unspecified;  J32.9-Chronic sinusitis, unspecified; J01.91-Acute recurrent sinusitis,  unspecified      COMPARISON: None     DOSE LENGTH PRODUCT: 320 mGy cm. Automated exposure control was also  utilized to decrease patient radiation dose.     TECHNIQUE: Axial images of the paranasal sinuses are obtained with  sagittal coronal reconstructions.     FINDINGS:  There is chronic mucosal thickening of the paranasal sinuses  with greatest involvement of the ethmoid sinuses. No air-fluid levels  are localized. No destructive bony changes. Minimal right-sided  deviation nasal septum. Orbital globes intact. No retrobulbar pathology.  Mastoid air cells are well-aerated. No fluid identified in the middle  ear.     The estimated complexes are mildly narrowed but patent.     IMPRESSION:  1. Chronic mucosal thickening of paranasal sinuses with greatest  involvement of the ethmoid sinuses. Mild narrowing of the ostiomeatal  complexes.  This report was finalized on 11/23/2020 16:48 by Dr. Jennifer Olson MD.                    Past Medical History:   Diagnosis Date   • Asthma    • Sinusitis        Past Surgical History:   Procedure Laterality Date   • WISDOM TOOTH EXTRACTION         Outpatient Medications Marked as Taking for the 12/8/20 encounter (Office Visit) with David Snyder MD   Medication Sig Dispense Refill   • albuterol sulfate  (90 Base) MCG/ACT inhaler Inhale 2 puffs As Needed.     • hydrOXYzine pamoate (VISTARIL) 25 MG capsule Take 25 mg by mouth As Needed.     • LO LOESTRIN FE 1 MG-10 MCG / 10 MCG tablet      • olopatadine (PATANASE) 0.6 % solution nasal solution 2 sprays by Each Nare route 2 (Two) Times a Day. 1 bottle 11   • [DISCONTINUED]  fluticasone (FLONASE) 50 MCG/ACT nasal spray          Patient has no known allergies.    Family History   Problem Relation Age of Onset   • Hyperlipidemia Mother    • Diabetes Father    • Hypertension Father        Social History     Socioeconomic History   • Marital status: Single     Spouse name: Not on file   • Number of children: Not on file   • Years of education: Not on file   • Highest education level: Not on file   Tobacco Use   • Smoking status: Never Smoker   • Smokeless tobacco: Never Used   Substance and Sexual Activity   • Alcohol use: Not Currently     Comment: mainly on weekends   • Drug use: Never       Review of Systems   Constitutional: Negative.    HENT: Positive for congestion, postnasal drip, rhinorrhea and sinus pressure.    Eyes: Negative.    Respiratory: Negative.    Cardiovascular: Negative.    Gastrointestinal: Negative.    Endocrine: Negative.    Genitourinary: Negative.    Musculoskeletal: Negative.    Skin: Negative.    Allergic/Immunologic: Positive for environmental allergies.   Neurological: Positive for headaches.   Hematological: Negative.    Psychiatric/Behavioral: Negative.        Vitals:    12/08/20 1616   BP: 144/86   Pulse: 73   Temp: 98.7 °F (37.1 °C)       There is no height or weight on file to calculate BMI.    Objective     Physical Exam  Physical Exam  CONSTITUTIONAL: well nourished, alert, oriented, in no acute distress      COMMUNICATION AND VOICE: able to communicate normally, normal voice quality     HEAD: normocephalic, no lesions, atraumatic, no tenderness, no masses      FACE: appearance normal, no lesions, no tenderness, no deformities, facial motion symmetric     SALIVARY GLANDS: parotid glands with no tenderness, no swelling, no masses, submandibular glands with normal size, nontender     EYES: ocular motility normal, eyelids normal, orbits normal, no proptosis, conjunctiva normal , pupils equal, round      EARS:  Hearing: response to conversational voice normal  bilaterally   External Ears: auricles without lesions  Otoscopic: tympanic membrane appearance normal, no lesions, no perforation, normal mobility, no fluid     NOSE:  External Nose: structure normal, no tenderness on palpation, no nasal discharge, no lesions, no evidence of trauma, nostrils patent   Intranasal Exam: See endoscopy  Nasopharynx:      ORAL:  Lips: upper and lower lips without lesion   Teeth: dentition within normal limits for age   Gums: gingivae healthy   Oral Mucosa: oral mucosa normal, no mucosal lesions   Floor of Mouth: Warthin’s duct patent, mucosa normal  Tongue: lingual mucosa normal without lesions, normal tongue mobility   Palate: soft and hard palates with normal mucosa and structure  Oropharynx: oropharyngeal mucosa normal     HYPOPHARYNX:   LARYNX:       NECK: neck appearance normal, no mass,  noted without erythema or tenderness     THYROID: no overt thyromegaly, no tenderness, nodules or mass present on palpation, position midline      LYMPH NODES: no lymphadenopathy     CHEST/RESPIRATORY: respiratory effort normaL     CARDIOVASCULAR: extremities without cyanosis or edema       NEUROLOGIC/PSYCHIATRIC: oriented to time, place and person, mood normal, affect appropriate, CN II-XII intact grossly    Assessment/Plan   Diagnoses and all orders for this visit:    1. Chronic pansinusitis (Primary)  -     Case Request; Standing  -     Comprehensive Metabolic Panel; Future  -     CBC (No Diff); Future  -     APTT; Future  -     Case Request    2. Nasal septal deviation  Comments:  Right side with spurring  Orders:  -     Case Request; Standing  -     Comprehensive Metabolic Panel; Future  -     CBC (No Diff); Future  -     APTT; Future  -     Case Request    3. Tatiana bullosa  Comments:  Bilateral  Orders:  -     Case Request; Standing  -     Comprehensive Metabolic Panel; Future  -     CBC (No Diff); Future  -     APTT; Future  -     Case Request    4. Hypertrophy of both inferior nasal  turbinates  -     Case Request; Standing  -     Comprehensive Metabolic Panel; Future  -     CBC (No Diff); Future  -     APTT; Future  -     Case Request    5. Seasonal allergic rhinitis due to pollen  -     Case Request; Standing  -     Comprehensive Metabolic Panel; Future  -     CBC (No Diff); Future  -     APTT; Future  -     Case Request    Other orders  -     Follow Anesthesia Guidelines / Standing Orders; Future  -     Obtain Informed Consent  -     Follow Anesthesia Guidelines / Standing Orders; Standing  -     Verify NPO Status; Standing  -     NPO Diet; Standing  -     Obtain Informed Consent; Standing  -     SCD (Sequential Compression Device) - To Be Placed on Patient in Pre-Op; Standing      ENDOSCOPIC FUNCTIONAL SINUS SURGERY W TRACKING, BILATERAL KAYLEN BULLOSA RESECTION, SEPTOPLASTY, RESECT INFERIOR TURBINATES VIA COBLATION AND POSTERIOR NERVE ABLATION VIA CLARIFIX (Bilateral)  Orders Placed This Encounter   Procedures   • Comprehensive Metabolic Panel     Standing Status:   Future     Standing Expiration Date:   12/8/2021   • CBC (No Diff)     Standing Status:   Future     Standing Expiration Date:   12/8/2021   • APTT     Standing Status:   Future     Standing Expiration Date:   12/8/2021   • Follow Anesthesia Guidelines / Standing Orders     Standing Status:   Future   • Obtain Informed Consent     Order Specific Question:   Informed Consent Given For     Answer:   ENDOSCOPIC FUNCTIONAL SINUS SURGERY W TRACKING, SINUPLASTY, SEPTOPLASTY, RESECT INFERIOR TURBINATES VIA COBLATION     Return for postop.       Patient Instructions   Risk benefits and options of a milk and dairy free diet associated with continuation of intranasal steroid sprays and antihistamine nasal sprays was discussed  The option of consideration of endonasal sinus surgery was also discussed and the patient as well as her mom opted to proceed with surgery.    FUNCTIONAL ENDOSCOPIC SINUS SURGERY WITH BILATERAL KAYLEN BULLOSA  RESECTION: A functional endoscopic sinus surgery was recommended. The risks and benefits were explained including but not limited to pain, bleeding (with the possible need for nasal packing), infection, risks of the general anesthesia, orbital injury with blurred vision or visual loss, cerebrospinal fluid leak, persistent disease, scarring, synichiae and the possibility for the need of reoperation. Possibilities of additional sinus work or less sinus work depending on the status of the nose at the time of the operation was discussed. Alternatives were discussed. No guarantees were made or implied. Questions were asked appropriately answered.    SEPTOPLASTY AND TURBINOPLASTY/CLARIFIX: A septoplasty and inferior turbinoplasty were recommended. The risks and benefits were explained including but not limited to pain, bleeding, infection, risks of the general anesthesia, continued septal deviation, crusting, congestion and septal perforation. Possibilities of continued preoperative symptoms and the possible need for revision surgery and or medical therapy were discussed. Alternatives were discussed. No guarantees were made or implied. Questions were asked appropriately answered.

## 2020-12-08 ENCOUNTER — OFFICE VISIT (OUTPATIENT)
Dept: OTOLARYNGOLOGY | Facility: CLINIC | Age: 20
End: 2020-12-08

## 2020-12-08 VITALS — SYSTOLIC BLOOD PRESSURE: 144 MMHG | TEMPERATURE: 98.7 F | HEART RATE: 73 BPM | DIASTOLIC BLOOD PRESSURE: 86 MMHG

## 2020-12-08 DIAGNOSIS — J34.2 NASAL SEPTAL DEVIATION: ICD-10-CM

## 2020-12-08 DIAGNOSIS — J34.3 HYPERTROPHY OF BOTH INFERIOR NASAL TURBINATES: ICD-10-CM

## 2020-12-08 DIAGNOSIS — J30.1 SEASONAL ALLERGIC RHINITIS DUE TO POLLEN: ICD-10-CM

## 2020-12-08 DIAGNOSIS — J32.4 CHRONIC PANSINUSITIS: Primary | ICD-10-CM

## 2020-12-08 DIAGNOSIS — J34.89 CONCHA BULLOSA: Chronic | ICD-10-CM

## 2020-12-08 PROCEDURE — 99214 OFFICE O/P EST MOD 30 MIN: CPT | Performed by: OTOLARYNGOLOGY

## 2020-12-08 PROCEDURE — 31231 NASAL ENDOSCOPY DX: CPT | Performed by: OTOLARYNGOLOGY

## 2020-12-08 NOTE — PROGRESS NOTES
PROCEDURE NOTE    Caprice Thibodeaux    DATE OF PROCEDURE: 12/8/2020    PROCEDURE:   Bilateral Diagnostic Rigid Nasal Endoscopy    PREPROCEDURE DIAGNOSIS:   Bilateral chronic pansinusitis bib bullosa deformity and septal deviation    POSTPROCEDURE DIAGNOSIS:  SAME    ANESTHESIA:   None    PROCEDURE DESCRIPTION:    With the patient in the chair bilateral diagnostic rigid nasal endoscopy was performed with the Stortz 0° endoscope without difficulty.     An endoscope was passed along the left nasal floor to the nasopharynx.  It was then passed into the region of the middle meatus, middle turbinate, and the sphenoethmoid region. An identical procedure was performed on the right side.  The following findings were noted as stated below:    Findings: Moderate to severe inferior turbinate hypertrophy bilaterally with right septal deviation and inferior septal spurring.  Bilateral bib bullosa deformity is noted with thickened secretions and narrow middle meatus bilaterally.  No polyposis is noted    Condition:  Stable.  Patient tolerated procedure well.    Complications:  None  There was no significant bleeding.

## 2020-12-08 NOTE — PATIENT INSTRUCTIONS
Risk benefits and options of a milk and dairy free diet associated with continuation of intranasal steroid sprays and antihistamine nasal sprays was discussed  The option of consideration of endonasal sinus surgery was also discussed and the patient as well as her mom opted to proceed with surgery.    FUNCTIONAL ENDOSCOPIC SINUS SURGERY WITH BILATERAL KAYLEN BULLOSA RESECTION: A functional endoscopic sinus surgery was recommended. The risks and benefits were explained including but not limited to pain, bleeding (with the possible need for nasal packing), infection, risks of the general anesthesia, orbital injury with blurred vision or visual loss, cerebrospinal fluid leak, persistent disease, scarring, synichiae and the possibility for the need of reoperation. Possibilities of additional sinus work or less sinus work depending on the status of the nose at the time of the operation was discussed. Alternatives were discussed. No guarantees were made or implied. Questions were asked appropriately answered.    SEPTOPLASTY AND TURBINOPLASTY/CLARIFIX: A septoplasty and inferior turbinoplasty were recommended. The risks and benefits were explained including but not limited to pain, bleeding, infection, risks of the general anesthesia, continued septal deviation, crusting, congestion and septal perforation. Possibilities of continued preoperative symptoms and the possible need for revision surgery and or medical therapy were discussed. Alternatives were discussed. No guarantees were made or implied. Questions were asked appropriately answered.

## 2020-12-11 PROBLEM — J30.1 SEASONAL ALLERGIC RHINITIS DUE TO POLLEN: Status: ACTIVE | Noted: 2020-12-11

## 2020-12-21 ENCOUNTER — TRANSCRIBE ORDERS (OUTPATIENT)
Dept: ADMINISTRATIVE | Facility: HOSPITAL | Age: 20
End: 2020-12-21

## 2020-12-21 DIAGNOSIS — Z11.59 SCREENING FOR VIRAL DISEASE: Primary | ICD-10-CM

## 2020-12-23 ENCOUNTER — APPOINTMENT (OUTPATIENT)
Dept: PREADMISSION TESTING | Facility: HOSPITAL | Age: 20
End: 2020-12-23

## 2020-12-23 VITALS
RESPIRATION RATE: 16 BRPM | HEIGHT: 69 IN | HEART RATE: 52 BPM | BODY MASS INDEX: 29.06 KG/M2 | DIASTOLIC BLOOD PRESSURE: 64 MMHG | OXYGEN SATURATION: 99 % | WEIGHT: 196.21 LBS | SYSTOLIC BLOOD PRESSURE: 135 MMHG

## 2020-12-23 DIAGNOSIS — J34.2 NASAL SEPTAL DEVIATION: ICD-10-CM

## 2020-12-23 DIAGNOSIS — J30.1 SEASONAL ALLERGIC RHINITIS DUE TO POLLEN: ICD-10-CM

## 2020-12-23 DIAGNOSIS — J32.4 CHRONIC PANSINUSITIS: ICD-10-CM

## 2020-12-23 DIAGNOSIS — J34.89 CONCHA BULLOSA: Chronic | ICD-10-CM

## 2020-12-23 DIAGNOSIS — J34.3 HYPERTROPHY OF BOTH INFERIOR NASAL TURBINATES: ICD-10-CM

## 2020-12-23 LAB
ALBUMIN SERPL-MCNC: 4.2 G/DL (ref 3.5–5.2)
ALBUMIN/GLOB SERPL: 1.4 G/DL
ALP SERPL-CCNC: 94 U/L (ref 39–117)
ALT SERPL W P-5'-P-CCNC: 39 U/L (ref 1–33)
ANION GAP SERPL CALCULATED.3IONS-SCNC: 11 MMOL/L (ref 5–15)
APTT PPP: 28.6 SECONDS (ref 24.1–35)
AST SERPL-CCNC: 33 U/L (ref 1–32)
BILIRUB SERPL-MCNC: 0.3 MG/DL (ref 0–1.2)
BUN SERPL-MCNC: 8 MG/DL (ref 6–20)
BUN/CREAT SERPL: 12.7 (ref 7–25)
CALCIUM SPEC-SCNC: 8.9 MG/DL (ref 8.6–10.5)
CHLORIDE SERPL-SCNC: 104 MMOL/L (ref 98–107)
CO2 SERPL-SCNC: 24 MMOL/L (ref 22–29)
CREAT SERPL-MCNC: 0.63 MG/DL (ref 0.57–1)
DEPRECATED RDW RBC AUTO: 38.6 FL (ref 37–54)
ERYTHROCYTE [DISTWIDTH] IN BLOOD BY AUTOMATED COUNT: 12.8 % (ref 12.3–15.4)
GFR SERPL CREATININE-BSD FRML MDRD: 119 ML/MIN/1.73
GLOBULIN UR ELPH-MCNC: 3.1 GM/DL
GLUCOSE SERPL-MCNC: 98 MG/DL (ref 65–99)
HCT VFR BLD AUTO: 40.6 % (ref 34–46.6)
HGB BLD-MCNC: 13.8 G/DL (ref 12–15.9)
MCH RBC QN AUTO: 28.2 PG (ref 26.6–33)
MCHC RBC AUTO-ENTMCNC: 34 G/DL (ref 31.5–35.7)
MCV RBC AUTO: 83 FL (ref 79–97)
PLATELET # BLD AUTO: 262 10*3/MM3 (ref 140–450)
PMV BLD AUTO: 12 FL (ref 6–12)
POTASSIUM SERPL-SCNC: 3.8 MMOL/L (ref 3.5–5.2)
PROT SERPL-MCNC: 7.3 G/DL (ref 6–8.5)
RBC # BLD AUTO: 4.89 10*6/MM3 (ref 3.77–5.28)
SODIUM SERPL-SCNC: 139 MMOL/L (ref 136–145)
WBC # BLD AUTO: 9 10*3/MM3 (ref 3.4–10.8)

## 2020-12-23 PROCEDURE — 80053 COMPREHEN METABOLIC PANEL: CPT

## 2020-12-23 PROCEDURE — 85730 THROMBOPLASTIN TIME PARTIAL: CPT

## 2020-12-23 PROCEDURE — 36415 COLL VENOUS BLD VENIPUNCTURE: CPT

## 2020-12-23 PROCEDURE — 85027 COMPLETE CBC AUTOMATED: CPT

## 2020-12-23 NOTE — DISCHARGE INSTRUCTIONS
DAY OF SURGERY INSTRUCTIONS        YOUR SURGEON: Dr. Snyder    PROCEDURE: Endoscopic Functional Sinus surgery with tracking, bilateral bib bullosa resection, septoplasty, resect inferior turbinates via coblation and posterior nerve ablation via clarifix    DATE OF SURGERY: December 30, 2020    ARRIVAL TIME: AS DIRECTED BY OFFICE    YOU MAY TAKE THE FOLLOWING MEDICATION(S) THE MORNING OF SURGERY WITH A SIP OF WATER: vistaril    ALL OTHER HOME MEDICATIONS CHECK WITH YOUR DOCTOR    DO NOT TAKE ANY ERECTILE DYSFUNCTION MEDICATIONS (EX:  CIALIS, VIAGRA) 24 HOURS PRIOR TO SURGERY              MANAGING PAIN AFTER SURGERY    We know you are probably wondering what your pain will be like after surgery.  Following surgery it is unrealistic to expect you will not have pain.   Pain is how our bodies let us know that something is wrong or cautions us to be careful.  That said, our goal is to make your pain tolerable.    Methods we may use to treat your pain include (oral or IV medications, PCAs, epidurals, nerve blocks, etc.)   While some procedures require IV pain medications for a short time after surgery, transitioning to pain medications by mouth allows for better management of pain.   Your nurse will encourage you to take oral pain medications whenever possible.  IV medications work almost immediately, but only last a short while.  Taking medications by mouth allows for a more constant level of medication in your blood stream for a longer period of time.      Once your pain is out of control it is harder to get back under control.  It is important you are aware when your next dose of pain medication is due.  If you are admitted, your nurse may write the time of your next dose on the white board in your room to help you remember.      We are interested in your pain and encourage you to inform us about aggravating factors during your visit.   Many times a simple repositioning every few hours can make a big difference.    If  your physician says it is okay, do not let your pain prevent you from getting out of bed. Be sure to call your nurse for assistance prior to getting up so you do not fall.      Before surgery, please decide your tolerable pain goal.  These faces help describe the pain ratings we use on a 0-10 scale.   Be prepared to tell us your goal and whether or not you take pain or anxiety medications at home.      BEFORE YOU COME TO THE HOSPITAL  (Pre-op instructions)  • Do not eat, drink, smoke or chew gum after midnight the night before surgery.  This also includes no mints.  • Morning of surgery take only the medicines you have been instructed with a sip of water unless otherwise instructed  by your physician.  • Do not shave, wear makeup or dark nail polish.  • Remove all jewelry including rings.  • Leave anything you consider valuable at home.  • Leave your suitcase in the car until after your surgery.  • Bring the following with you if applicable:  o Picture ID and insurance, Medicare or Medicaid cards  o Co-pay/deductible required by insurance (cash, check, credit card)  o Copy of advance directive, living will or power-of- documents if not brought to PAT  o CPAP or BIPAP mask and tubing  o Relaxation aids ( book, magazine), etc.  o Hearing aids                                 ON THE DAY OF SURGERY  · On the day of surgery check in at registration located at the main entrance of the hospital.   ? You will be registered and given a beeper with instructions where to wait in the main lobby.  ? When your beeper lights up and vibrates a member of the Outpatient Surgery staff will meet you at the double doors under the stair steps and escort you to your preoperative room.   · You may have cloth compression devices placed on your legs. These help to prevent blood clots and reduce swelling in your legs.  · An IV may be inserted into one of your veins.  · In the operating room, you may be given one or more of the  "following:  ? A medicine to help you relax (sedative).  ? A medicine to numb the area (local anesthetic).  ? A medicine to make you fall asleep (general anesthetic).  ? A medicine that is injected into an area of your body to numb everything below the injection site (regional anesthetic).  · Your surgical site will be marked or identified.  · You may be given an antibiotic through your IV to help prevent infection.  Contact a health care provider if you:  · Develop a fever of more than 100.4°F (38°C) or other feelings of illness during the 48 hours before your surgery.  · Have symptoms that get worse.  Have questions or concerns about your surgery    General Anesthesia/Surgery, Adult  General anesthesia is the use of medicines to make a person \"go to sleep\" (unconscious) for a medical procedure. General anesthesia must be used for certain procedures, and is often recommended for procedures that:  · Last a long time.  · Require you to be still or in an unusual position.  · Are major and can cause blood loss.  The medicines used for general anesthesia are called general anesthetics. As well as making you unconscious for a certain amount of time, these medicines:  · Prevent pain.  · Control your blood pressure.  · Relax your muscles.  Tell a health care provider about:  · Any allergies you have.  · All medicines you are taking, including vitamins, herbs, eye drops, creams, and over-the-counter medicines.  · Any problems you or family members have had with anesthetic medicines.  · Types of anesthetics you have had in the past.  · Any blood disorders you have.  · Any surgeries you have had.  · Any medical conditions you have.  · Any recent upper respiratory, chest, or ear infections.  · Any history of:  ? Heart or lung conditions, such as heart failure, sleep apnea, asthma, or chronic obstructive pulmonary disease (COPD).  ?  service.  ? Depression or anxiety.  · Any tobacco or drug use, including marijuana or " alcohol use.  · Whether you are pregnant or may be pregnant.  What are the risks?  Generally, this is a safe procedure. However, problems may occur, including:  · Allergic reaction.  · Lung and heart problems.  · Inhaling food or liquid from the stomach into the lungs (aspiration).  · Nerve injury.  · Air in the bloodstream, which can lead to stroke.  · Extreme agitation or confusion (delirium) when you wake up from the anesthetic.  · Waking up during your procedure and being unable to move. This is rare.  These problems are more likely to develop if you are having a major surgery or if you have an advanced or serious medical condition. You can prevent some of these complications by answering all of your health care provider's questions thoroughly and by following all instructions before your procedure.  General anesthesia can cause side effects, including:  · Nausea or vomiting.  · A sore throat from the breathing tube.  · Hoarseness.  · Wheezing or coughing.  · Shaking chills.  · Tiredness.  · Body aches.  · Anxiety.  · Sleepiness or drowsiness.  · Confusion or agitation.  RISKS AND COMPLICATIONS OF SURGERY  Your health care provider will discuss possible risks and complications with you before surgery. Common risks and complications include:    · Problems due to the use of anesthetics.  · Blood loss and replacement (does not apply to minor surgical procedures).  · Temporary increase in pain due to surgery.  · Uncorrected pain or problems that the surgery was meant to correct.  · Infection.  · New damage.    What happens before the procedure?    Medicines  Ask your health care provider about:  · Changing or stopping your regular medicines. This is especially important if you are taking diabetes medicines or blood thinners.  · Taking medicines such as aspirin and ibuprofen. These medicines can thin your blood. Do not take these medicines unless your health care provider tells you to take them.  · Taking  over-the-counter medicines, vitamins, herbs, and supplements. Do not take these during the week before your procedure unless your health care provider approves them.  General instructions  · Starting 3-6 weeks before the procedure, do not use any products that contain nicotine or tobacco, such as cigarettes and e-cigarettes. If you need help quitting, ask your health care provider.  · If you brush your teeth on the morning of the procedure, make sure to spit out all of the toothpaste.  · Tell your health care provider if you become ill or develop a cold, cough, or fever.  · If instructed by your health care provider, bring your sleep apnea device with you on the day of your surgery (if applicable).  · Ask your health care provider if you will be going home the same day, the following day, or after a longer hospital stay.  ? Plan to have someone take you home from the hospital or clinic.  ? Plan to have a responsible adult care for you for at least 24 hours after you leave the hospital or clinic. This is important.  What happens during the procedure?  · You will be given anesthetics through both of the following:  ? A mask placed over your nose and mouth.  ? An IV in one of your veins.  · You may receive a medicine to help you relax (sedative).  · After you are unconscious, a breathing tube may be inserted down your throat to help you breathe. This will be removed before you wake up.  · An anesthesia specialist will stay with you throughout your procedure. He or she will:  ? Keep you comfortable and safe by continuing to give you medicines and adjusting the amount of medicine that you get.  ? Monitor your blood pressure, pulse, and oxygen levels to make sure that the anesthetics do not cause any problems.  The procedure may vary among health care providers and hospitals.  What happens after the procedure?  · Your blood pressure, temperature, heart rate, breathing rate, and blood oxygen level will be monitored until  the medicines you were given have worn off.  · You will wake up in a recovery area. You may wake up slowly.  · If you feel anxious or agitated, you may be given medicine to help you calm down.  · If you will be going home the same day, your health care provider may check to make sure you can walk, drink, and urinate.  · Your health care provider will treat any pain or side effects you have before you go home.  · Do not drive for 24 hours if you were given a sedative.  Summary  · General anesthesia is used to keep you still and prevent pain during a procedure.  · It is important to tell your healthcare provider about your medical history and any surgeries you have had, and previous experience with anesthesia.  · Follow your healthcare provider’s instructions about when to stop eating, drinking, or taking certain medicines before your procedure.  · Plan to have someone take you home from the hospital or clinic.  This information is not intended to replace advice given to you by your health care provider. Make sure you discuss any questions you have with your health care provider.  Document Released: 03/26/2009 Document Revised: 08/03/2018 Document Reviewed: 08/03/2018  [employer] Interactive Patient Education © 2019 [employer]      Fall Prevention in Hospitals, Adult  As a hospital patient, your condition and the treatments you receive can increase your risk for falls. Some additional risk factors for falls in a hospital include:  · Being in an unfamiliar environment.  · Being on bed rest.  · Your surgery.  · Taking certain medicines.  · Your tubing requirements, such as intravenous (IV) therapy or catheters.  It is important that you learn how to decrease fall risks while at the hospital. Below are important tips that can help prevent falls.  SAFETY TIPS FOR PREVENTING FALLS  Talk about your risk of falling.  · Ask your health care provider why you are at risk for falling. Is it your medicine, illness, tubing  placement, or something else?  · Make a plan with your health care provider to keep you safe from falls.  · Ask your health care provider or pharmacist about side effects of your medicines. Some medicines can make you dizzy or affect your coordination.  Ask for help.  · Ask for help before getting out of bed. You may need to press your call button.  · Ask for assistance in getting safely to the toilet.  · Ask for a walker or cane to be put at your bedside. Ask that most of the side rails on your bed be placed up before your health care provider leaves the room.  · Ask family or friends to sit with you.  · Ask for things that are out of your reach, such as your glasses, hearing aids, telephone, bedside table, or call button.  Follow these tips to avoid falling:  · Stay lying or seated, rather than standing, while waiting for help.  · Wear rubber-soled slippers or shoes whenever you walk in the hospital.  · Avoid quick, sudden movements.  ¨ Change positions slowly.  ¨ Sit on the side of your bed before standing.  ¨ Stand up slowly and wait before you start to walk.  · Let your health care provider know if there is a spill on the floor.  · Pay careful attention to the medical equipment, electrical cords, and tubes around you.  · When you need help, use your call button by your bed or in the bathroom. Wait for one of your health care providers to help you.  · If you feel dizzy or unsure of your footing, return to bed and wait for assistance.  · Avoid being distracted by the TV, telephone, or another person in your room.  · Do not lean or support yourself on rolling objects, such as IV poles or bedside tables.     This information is not intended to replace advice given to you by your health care provider. Make sure you discuss any questions you have with your health care provider.     Document Released: 12/15/2001 Document Revised: 01/08/2016 Document Reviewed: 08/25/2013  [employer] Interactive Patient Education ©2016  [employer]            PATIENT/FAMILY/RESPONSIBLE PARTY VERBALIZES UNDERSTANDING OF ABOVE EDUCATION.  COPY OF PAIN SCALE GIVEN AND REVIEWED WITH VERBALIZED UNDERSTANDING.

## 2020-12-29 ENCOUNTER — OFFICE VISIT (OUTPATIENT)
Dept: OBGYN | Age: 20
End: 2020-12-29
Payer: COMMERCIAL

## 2020-12-29 VITALS
SYSTOLIC BLOOD PRESSURE: 132 MMHG | DIASTOLIC BLOOD PRESSURE: 85 MMHG | HEART RATE: 83 BPM | HEIGHT: 69 IN | WEIGHT: 196 LBS | BODY MASS INDEX: 29.03 KG/M2 | TEMPERATURE: 98.4 F

## 2020-12-29 DIAGNOSIS — E01.0 THYROMEGALY: ICD-10-CM

## 2020-12-29 LAB
T4 FREE: 1.13 NG/DL (ref 0.93–1.7)
TSH SERPL DL<=0.05 MIU/L-ACNC: 2 UIU/ML (ref 0.27–4.2)

## 2020-12-29 PROCEDURE — 99395 PREV VISIT EST AGE 18-39: CPT | Performed by: NURSE PRACTITIONER

## 2020-12-29 ASSESSMENT — ENCOUNTER SYMPTOMS
RESPIRATORY NEGATIVE: 1
GASTROINTESTINAL NEGATIVE: 1
DIARRHEA: 0
ALLERGIC/IMMUNOLOGIC NEGATIVE: 1
CONSTIPATION: 0
EYES NEGATIVE: 1

## 2020-12-29 NOTE — PROGRESS NOTES
Ang Daugherty is a 23 y.o. female who presents today for her medical conditions/ complaints as noted below. Ang Daugherty is c/o of Gynecologic Exam (Patient presents today for a pap smear and breast exam.)        HPI  Pt presents for annual exam and pap smear. Taking OCP and denies problems. New partner since last exam.     Patient's last menstrual period was 12/23/2020. No obstetric history on file. Past Medical History:   Diagnosis Date    Asthma     Generalized anxiety disorder     Insomnia      History reviewed. No pertinent surgical history. Family History   Problem Relation Age of Onset    Diabetes Father     Heart Failure Maternal Grandmother     Heart Failure Maternal Grandfather     Heart Failure Paternal Grandmother     Stroke Paternal Grandmother     Diabetes Paternal Grandmother     Heart Failure Paternal Grandfather      Social History     Tobacco Use    Smoking status: Never Smoker    Smokeless tobacco: Never Used   Substance Use Topics    Alcohol use: No       Current Outpatient Medications   Medication Sig Dispense Refill    norethindrone-ethinyl estradiol-Fe (LO LOESTRIN FE) 1 MG-10 MCG / 10 MCG tablet Take 1 tablet by mouth daily 84 tablet 3    albuterol sulfate HFA (VENTOLIN HFA) 108 (90 Base) MCG/ACT inhaler Inhale 2 puffs into the lungs 4 times daily as needed for Wheezing 1 Inhaler 5    hydrOXYzine (VISTARIL) 25 MG capsule Take 1 capsule by mouth 3 times daily as needed for Anxiety 60 capsule 0    cetirizine (ZYRTEC) 10 MG tablet Take 10 mg by mouth as needed for Allergies       No current facility-administered medications for this visit. No Known Allergies  Vitals:    12/29/20 1509   BP: 132/85   Pulse: 83   Temp: 98.4 °F (36.9 °C)     Body mass index is 28.94 kg/m². Review of Systems   Constitutional: Negative. HENT: Negative. Eyes: Negative. Respiratory: Negative. Cardiovascular: Negative. Gastrointestinal: Negative.   Negative for constipation and diarrhea. Endocrine: Negative. Genitourinary: Negative. Negative for frequency, menstrual problem and urgency. Musculoskeletal: Negative. Skin: Negative. Allergic/Immunologic: Negative. Neurological: Negative. Hematological: Negative. Psychiatric/Behavioral: Negative. All other systems reviewed and are negative. Physical Exam  Vitals signs and nursing note reviewed. Constitutional:       Appearance: She is well-developed. HENT:      Head: Normocephalic. Neck:      Musculoskeletal: Normal range of motion and neck supple. Thyroid: Thyromegaly present. No thyroid mass. Cardiovascular:      Rate and Rhythm: Normal rate and regular rhythm. Pulmonary:      Effort: Pulmonary effort is normal.      Breath sounds: Normal breath sounds. Chest:      Breasts:         Right: No inverted nipple, mass, nipple discharge or skin change. Left: No inverted nipple, mass, nipple discharge or skin change. Abdominal:      Palpations: Abdomen is soft. There is no mass. Tenderness: There is no abdominal tenderness. Genitourinary:     General: Normal vulva. Vagina: Normal.      Cervix: No cervical motion tenderness. Uterus: Normal. Not enlarged. Adnexa:         Right: No mass or tenderness. Left: No mass or tenderness. Comments: Pap collected  Musculoskeletal: Normal range of motion. Skin:     General: Skin is warm and dry. Neurological:      Mental Status: She is alert and oriented to person, place, and time. Psychiatric:         Attention and Perception: Attention normal.         Mood and Affect: Mood normal.         Speech: Speech normal.         Behavior: Behavior normal.         Thought Content: Thought content normal.         Cognition and Memory: Cognition normal.         Judgment: Judgment normal.          Diagnosis Orders   1.  Well woman exam with routine gynecological exam  PAP SMEAR    Sexually Transmitted Disease Panel 1 TP   2. Screening for cervical cancer  PAP SMEAR   3. Thyromegaly  TSH without Reflex    T4, Free    US THYROID   4. Irregular menstrual cycle  norethindrone-ethinyl estradiol-Fe (LO LOESTRIN FE) 1 MG-10 MCG / 10 MCG tablet       MEDICATIONS:  Orders Placed This Encounter   Medications    norethindrone-ethinyl estradiol-Fe (LO LOESTRIN FE) 1 MG-10 MCG / 10 MCG tablet     Sig: Take 1 tablet by mouth daily     Dispense:  84 tablet     Refill:  3       ORDERS:  Orders Placed This Encounter   Procedures    Sexually Transmitted Disease Panel 1 TP    US THYROID    PAP SMEAR    TSH without Reflex    T4, Free       PLAN:  Pap collected  STD testing on pap  Thyroid labs and u/s ordered  Refilled OCP    Patient Instructions   Patient Education        Well Visit, Ages 25 to 48: Care Instructions  Your Care Instructions     Physical exams can help you stay healthy. Your doctor has checked your overall health and may have suggested ways to take good care of yourself. He or she also may have recommended tests. At home, you can help prevent illness with healthy eating, regular exercise, and other steps. Follow-up care is a key part of your treatment and safety. Be sure to make and go to all appointments, and call your doctor if you are having problems. It's also a good idea to know your test results and keep a list of the medicines you take. How can you care for yourself at home? · Reach and stay at a healthy weight. This will lower your risk for many problems, such as obesity, diabetes, heart disease, and high blood pressure. · Get at least 30 minutes of physical activity on most days of the week. Walking is a good choice. You also may want to do other activities, such as running, swimming, cycling, or playing tennis or team sports. Discuss any changes in your exercise program with your doctor. · Do not smoke or allow others to smoke around you.  If you need help quitting, talk to your doctor about stop-smoking programs and medicines. These can increase your chances of quitting for good. · Talk to your doctor about whether you have any risk factors for sexually transmitted infections (STIs). Having one sex partner (who does not have STIs and does not have sex with anyone else) is a good way to avoid these infections. · Use birth control if you do not want to have children at this time. Talk with your doctor about the choices available and what might be best for you. · Protect your skin from too much sun. When you're outdoors from 10 a.m. to 4 p.m., stay in the shade or cover up with clothing and a hat with a wide brim. Wear sunglasses that block UV rays. Even when it's cloudy, put broad-spectrum sunscreen (SPF 30 or higher) on any exposed skin. · See a dentist one or two times a year for checkups and to have your teeth cleaned. · Wear a seat belt in the car. Follow your doctor's advice about when to have certain tests. These tests can spot problems early. For everyone  · Cholesterol. Have the fat (cholesterol) in your blood tested after age 21. Your doctor will tell you how often to have this done based on your age, family history, or other things that can increase your risk for heart disease. · Blood pressure. Have your blood pressure checked during a routine doctor visit. Your doctor will tell you how often to check your blood pressure based on your age, your blood pressure results, and other factors. · Vision. Talk with your doctor about how often to have a glaucoma test.  · Diabetes. Ask your doctor whether you should have tests for diabetes. · Colon cancer. Your risk for colorectal cancer gets higher as you get older. Some experts say that adults should start regular screening at age 48 and stop at age 76. Others say to start before age 48 or continue after age 76. Talk with your doctor about your risk and when to start and stop screening.   For women  · Breast exam and mammogram. Talk to your doctor about when you license by [facility] ([facility]). If you have questions about a medical condition or this instruction, always ask your healthcare professional. [place] [address] any warranty or liability for your use of this information.

## 2020-12-29 NOTE — PATIENT INSTRUCTIONS
exposed skin. · See a dentist one or two times a year for checkups and to have your teeth cleaned. · Wear a seat belt in the car. Follow your doctor's advice about when to have certain tests. These tests can spot problems early. For everyone  · Cholesterol. Have the fat (cholesterol) in your blood tested after age 21. Your doctor will tell you how often to have this done based on your age, family history, or other things that can increase your risk for heart disease. · Blood pressure. Have your blood pressure checked during a routine doctor visit. Your doctor will tell you how often to check your blood pressure based on your age, your blood pressure results, and other factors. · Vision. Talk with your doctor about how often to have a glaucoma test.  · Diabetes. Ask your doctor whether you should have tests for diabetes. · Colon cancer. Your risk for colorectal cancer gets higher as you get older. Some experts say that adults should start regular screening at age 48 and stop at age 76. Others say to start before age 48 or continue after age 76. Talk with your doctor about your risk and when to start and stop screening. For women  · Breast exam and mammogram. Talk to your doctor about when you should have a clinical breast exam and a mammogram. Medical experts differ on whether and how often women under 50 should have these tests. Your doctor can help you decide what is right for you. · Cervical cancer screening test and pelvic exam. Begin with a Pap test at age 24. The test often is part of a pelvic exam. Starting at age 27, you may choose to have a Pap test, an HPV test, or both tests at the same time (called co-testing). Talk with your doctor about how often to have testing. · Tests for sexually transmitted infections (STIs). Ask whether you should have tests for STIs. You may be at risk if you have sex with more than one person, especially if your partners do not wear condoms.   For men  · Tests for sexually transmitted infections (STIs). Ask whether you should have tests for STIs. You may be at risk if you have sex with more than one person, especially if you do not wear a condom. · Testicular cancer exam. Ask your doctor whether you should check your testicles regularly. · Prostate exam. Talk to your doctor about whether you should have a blood test (called a PSA test) for prostate cancer. Experts differ on whether and when men should have this test. Some experts suggest it if you are older than 39 and are -American or have a father or brother who got prostate cancer when he was younger than 72. When should you call for help? Watch closely for changes in your health, and be sure to contact your doctor if you have any problems or symptoms that concern you. Where can you learn more? Go to [email or web address] org and sign in to your [email or web address] account. Enter P072 in the [employer] box to learn more about \"Well Visit, Ages 25 to 48: Care Instructions. \"     If you do not have an account, please click on the \"Sign Up Now\" link. Current as of: May 27, 2020               Content Version: 12.6  © 0653-9578 [employer]. Care instructions adapted under license by [facility] ([facility]). If you have questions about a medical condition or this instruction, always ask your healthcare professional. [address] any warranty or liability for your use of this information.

## 2020-12-30 ENCOUNTER — HOSPITAL ENCOUNTER (OUTPATIENT)
Facility: HOSPITAL | Age: 20
Setting detail: HOSPITAL OUTPATIENT SURGERY
Discharge: HOME | End: 2020-12-30
Attending: OTOLARYNGOLOGY | Admitting: OTOLARYNGOLOGY

## 2020-12-30 ENCOUNTER — ANESTHESIA (OUTPATIENT)
Dept: PERIOP | Facility: HOSPITAL | Age: 20
End: 2020-12-30

## 2020-12-30 ENCOUNTER — ANESTHESIA EVENT (OUTPATIENT)
Dept: PERIOP | Facility: HOSPITAL | Age: 20
End: 2020-12-30

## 2020-12-30 VITALS
RESPIRATION RATE: 18 BRPM | OXYGEN SATURATION: 95 % | HEART RATE: 85 BPM | SYSTOLIC BLOOD PRESSURE: 126 MMHG | DIASTOLIC BLOOD PRESSURE: 79 MMHG | TEMPERATURE: 99.7 F

## 2020-12-30 DIAGNOSIS — J34.89 CONCHA BULLOSA: ICD-10-CM

## 2020-12-30 DIAGNOSIS — J34.2 NASAL SEPTAL DEVIATION: ICD-10-CM

## 2020-12-30 DIAGNOSIS — J30.1 SEASONAL ALLERGIC RHINITIS DUE TO POLLEN: ICD-10-CM

## 2020-12-30 DIAGNOSIS — J32.4 CHRONIC PANSINUSITIS: Primary | ICD-10-CM

## 2020-12-30 DIAGNOSIS — J34.3 HYPERTROPHY OF BOTH INFERIOR NASAL TURBINATES: ICD-10-CM

## 2020-12-30 LAB
B-HCG UR QL: NEGATIVE
SARS-COV-2 RNA PNL SPEC NAA+PROBE: NOT DETECTED

## 2020-12-30 PROCEDURE — 30802 ABLATE INF TURBINATE SUBMUC: CPT | Performed by: OTOLARYNGOLOGY

## 2020-12-30 PROCEDURE — C2625 STENT, NON-COR, TEM W/DEL SY: HCPCS | Performed by: OTOLARYNGOLOGY

## 2020-12-30 PROCEDURE — 31254 NSL/SINS NDSC W/PRTL ETHMDCT: CPT | Performed by: OTOLARYNGOLOGY

## 2020-12-30 PROCEDURE — 31240 NSL/SNS NDSC CNCH BULL RESCJ: CPT | Performed by: OTOLARYNGOLOGY

## 2020-12-30 PROCEDURE — 88311 DECALCIFY TISSUE: CPT | Performed by: OTOLARYNGOLOGY

## 2020-12-30 PROCEDURE — 87635 SARS-COV-2 COVID-19 AMP PRB: CPT | Performed by: OTOLARYNGOLOGY

## 2020-12-30 PROCEDURE — 25010000002 PROPOFOL 10 MG/ML EMULSION: Performed by: NURSE ANESTHETIST, CERTIFIED REGISTERED

## 2020-12-30 PROCEDURE — 25010000002 ONDANSETRON PER 1 MG: Performed by: NURSE ANESTHETIST, CERTIFIED REGISTERED

## 2020-12-30 PROCEDURE — 30520 REPAIR OF NASAL SEPTUM: CPT | Performed by: OTOLARYNGOLOGY

## 2020-12-30 PROCEDURE — C9803 HOPD COVID-19 SPEC COLLECT: HCPCS

## 2020-12-30 PROCEDURE — C9046 COCAINE HCL NASAL SOLUTION: HCPCS | Performed by: OTOLARYNGOLOGY

## 2020-12-30 PROCEDURE — 25010000002 DEXAMETHASONE PER 1 MG: Performed by: NURSE ANESTHETIST, CERTIFIED REGISTERED

## 2020-12-30 PROCEDURE — 31256 EXPLORATION MAXILLARY SINUS: CPT | Performed by: OTOLARYNGOLOGY

## 2020-12-30 PROCEDURE — 25010000002 FENTANYL CITRATE (PF) 250 MCG/5ML SOLUTION: Performed by: NURSE ANESTHETIST, CERTIFIED REGISTERED

## 2020-12-30 PROCEDURE — 25010000002 DEXAMETHASONE PER 1 MG: Performed by: ANESTHESIOLOGY

## 2020-12-30 PROCEDURE — 88305 TISSUE EXAM BY PATHOLOGIST: CPT | Performed by: OTOLARYNGOLOGY

## 2020-12-30 PROCEDURE — C1769 GUIDE WIRE: HCPCS | Performed by: OTOLARYNGOLOGY

## 2020-12-30 PROCEDURE — 25010000002 COCAINE HCL 40 MG/ML SOLUTION: Performed by: OTOLARYNGOLOGY

## 2020-12-30 PROCEDURE — 25010000002 MIDAZOLAM PER 1 MG: Performed by: ANESTHESIOLOGY

## 2020-12-30 PROCEDURE — 81025 URINE PREGNANCY TEST: CPT | Performed by: OTOLARYNGOLOGY

## 2020-12-30 DEVICE — PROPEL SINUS IMPLANT
Type: IMPLANTABLE DEVICE | Site: NOSE | Status: FUNCTIONAL
Brand: PROPEL

## 2020-12-30 DEVICE — STPLR SEPTL ENTACT .5X3MM: Type: IMPLANTABLE DEVICE | Site: NOSE | Status: FUNCTIONAL

## 2020-12-30 RX ORDER — AMOXICILLIN 500 MG/1
500 CAPSULE ORAL 3 TIMES DAILY
Qty: 30 CAPSULE | Refills: 0 | Status: SHIPPED | OUTPATIENT
Start: 2020-12-30 | End: 2021-01-09

## 2020-12-30 RX ORDER — FLUMAZENIL 0.1 MG/ML
0.2 INJECTION INTRAVENOUS AS NEEDED
Status: DISCONTINUED | OUTPATIENT
Start: 2020-12-30 | End: 2020-12-30 | Stop reason: HOSPADM

## 2020-12-30 RX ORDER — FENTANYL CITRATE 50 UG/ML
25 INJECTION, SOLUTION INTRAMUSCULAR; INTRAVENOUS
Status: DISCONTINUED | OUTPATIENT
Start: 2020-12-30 | End: 2020-12-30 | Stop reason: HOSPADM

## 2020-12-30 RX ORDER — MAGNESIUM HYDROXIDE 1200 MG/15ML
LIQUID ORAL AS NEEDED
Status: DISCONTINUED | OUTPATIENT
Start: 2020-12-30 | End: 2020-12-30 | Stop reason: HOSPADM

## 2020-12-30 RX ORDER — ACETAMINOPHEN 500 MG
1000 TABLET ORAL ONCE
Status: COMPLETED | OUTPATIENT
Start: 2020-12-30 | End: 2020-12-30

## 2020-12-30 RX ORDER — HYDROCODONE BITARTRATE AND ACETAMINOPHEN 5; 325 MG/1; MG/1
1-2 TABLET ORAL EVERY 4 HOURS PRN
Qty: 8 TABLET | Refills: 0 | Status: SHIPPED | OUTPATIENT
Start: 2020-12-30 | End: 2021-01-07

## 2020-12-30 RX ORDER — ROCURONIUM BROMIDE 10 MG/ML
INJECTION, SOLUTION INTRAVENOUS AS NEEDED
Status: DISCONTINUED | OUTPATIENT
Start: 2020-12-30 | End: 2020-12-30 | Stop reason: SURG

## 2020-12-30 RX ORDER — NALOXONE HCL 0.4 MG/ML
0.4 VIAL (ML) INJECTION AS NEEDED
Status: DISCONTINUED | OUTPATIENT
Start: 2020-12-30 | End: 2020-12-30 | Stop reason: HOSPADM

## 2020-12-30 RX ORDER — SODIUM CHLORIDE, SODIUM LACTATE, POTASSIUM CHLORIDE, CALCIUM CHLORIDE 600; 310; 30; 20 MG/100ML; MG/100ML; MG/100ML; MG/100ML
INJECTION, SOLUTION INTRAVENOUS CONTINUOUS PRN
Status: COMPLETED | OUTPATIENT
Start: 2020-12-30 | End: 2020-12-30

## 2020-12-30 RX ORDER — SODIUM CHLORIDE, SODIUM LACTATE, POTASSIUM CHLORIDE, CALCIUM CHLORIDE 600; 310; 30; 20 MG/100ML; MG/100ML; MG/100ML; MG/100ML
100 INJECTION, SOLUTION INTRAVENOUS CONTINUOUS
Status: DISCONTINUED | OUTPATIENT
Start: 2020-12-30 | End: 2020-12-30 | Stop reason: HOSPADM

## 2020-12-30 RX ORDER — FAMOTIDINE 10 MG/ML
20 INJECTION, SOLUTION INTRAVENOUS
Status: COMPLETED | OUTPATIENT
Start: 2020-12-30 | End: 2020-12-30

## 2020-12-30 RX ORDER — DEXAMETHASONE SODIUM PHOSPHATE 4 MG/ML
4 INJECTION, SOLUTION INTRA-ARTICULAR; INTRALESIONAL; INTRAMUSCULAR; INTRAVENOUS; SOFT TISSUE ONCE AS NEEDED
Status: DISCONTINUED | OUTPATIENT
Start: 2020-12-30 | End: 2020-12-30 | Stop reason: SDUPTHER

## 2020-12-30 RX ORDER — SODIUM CHLORIDE 0.9 % (FLUSH) 0.9 %
3 SYRINGE (ML) INJECTION AS NEEDED
Status: DISCONTINUED | OUTPATIENT
Start: 2020-12-30 | End: 2020-12-30 | Stop reason: HOSPADM

## 2020-12-30 RX ORDER — HYDROCODONE BITARTRATE AND ACETAMINOPHEN 5; 325 MG/1; MG/1
1 TABLET ORAL ONCE AS NEEDED
Status: DISCONTINUED | OUTPATIENT
Start: 2020-12-30 | End: 2020-12-30 | Stop reason: HOSPADM

## 2020-12-30 RX ORDER — COCAINE HYDROCHLORIDE 40 MG/ML
SOLUTION NASAL AS NEEDED
Status: DISCONTINUED | OUTPATIENT
Start: 2020-12-30 | End: 2020-12-30 | Stop reason: HOSPADM

## 2020-12-30 RX ORDER — OXYMETAZOLINE HYDROCHLORIDE 0.05 G/100ML
2 SPRAY NASAL
Status: COMPLETED | OUTPATIENT
Start: 2020-12-30 | End: 2020-12-30

## 2020-12-30 RX ORDER — SODIUM CHLORIDE 0.9 % (FLUSH) 0.9 %
3 SYRINGE (ML) INJECTION EVERY 12 HOURS SCHEDULED
Status: DISCONTINUED | OUTPATIENT
Start: 2020-12-30 | End: 2020-12-30 | Stop reason: HOSPADM

## 2020-12-30 RX ORDER — COCAINE HYDROCHLORIDE 40 MG/ML
SOLUTION NASAL
Status: DISCONTINUED
Start: 2020-12-30 | End: 2020-12-30 | Stop reason: HOSPADM

## 2020-12-30 RX ORDER — DEXAMETHASONE SODIUM PHOSPHATE 4 MG/ML
INJECTION, SOLUTION INTRA-ARTICULAR; INTRALESIONAL; INTRAMUSCULAR; INTRAVENOUS; SOFT TISSUE AS NEEDED
Status: DISCONTINUED | OUTPATIENT
Start: 2020-12-30 | End: 2020-12-30 | Stop reason: SURG

## 2020-12-30 RX ORDER — OXYCODONE AND ACETAMINOPHEN 7.5; 325 MG/1; MG/1
2 TABLET ORAL EVERY 4 HOURS PRN
Status: DISCONTINUED | OUTPATIENT
Start: 2020-12-30 | End: 2020-12-30 | Stop reason: HOSPADM

## 2020-12-30 RX ORDER — OXYCODONE AND ACETAMINOPHEN 10; 325 MG/1; MG/1
1 TABLET ORAL ONCE AS NEEDED
Status: COMPLETED | OUTPATIENT
Start: 2020-12-30 | End: 2020-12-30

## 2020-12-30 RX ORDER — LIDOCAINE HYDROCHLORIDE 40 MG/ML
SOLUTION TOPICAL AS NEEDED
Status: DISCONTINUED | OUTPATIENT
Start: 2020-12-30 | End: 2020-12-30 | Stop reason: SURG

## 2020-12-30 RX ORDER — FENTANYL CITRATE 50 UG/ML
INJECTION, SOLUTION INTRAMUSCULAR; INTRAVENOUS AS NEEDED
Status: DISCONTINUED | OUTPATIENT
Start: 2020-12-30 | End: 2020-12-30 | Stop reason: SURG

## 2020-12-30 RX ORDER — WATER 1000 ML/1000ML
INJECTION, SOLUTION INTRAVENOUS AS NEEDED
Status: DISCONTINUED | OUTPATIENT
Start: 2020-12-30 | End: 2020-12-30 | Stop reason: HOSPADM

## 2020-12-30 RX ORDER — LABETALOL HYDROCHLORIDE 5 MG/ML
5 INJECTION, SOLUTION INTRAVENOUS
Status: DISCONTINUED | OUTPATIENT
Start: 2020-12-30 | End: 2020-12-30 | Stop reason: HOSPADM

## 2020-12-30 RX ORDER — MIDAZOLAM HYDROCHLORIDE 1 MG/ML
1 INJECTION INTRAMUSCULAR; INTRAVENOUS
Status: COMPLETED | OUTPATIENT
Start: 2020-12-30 | End: 2020-12-30

## 2020-12-30 RX ORDER — IBUPROFEN 600 MG/1
600 TABLET ORAL ONCE AS NEEDED
Status: DISCONTINUED | OUTPATIENT
Start: 2020-12-30 | End: 2020-12-30 | Stop reason: HOSPADM

## 2020-12-30 RX ORDER — PROPOFOL 10 MG/ML
VIAL (ML) INTRAVENOUS AS NEEDED
Status: DISCONTINUED | OUTPATIENT
Start: 2020-12-30 | End: 2020-12-30 | Stop reason: SURG

## 2020-12-30 RX ORDER — NEOSTIGMINE METHYLSULFATE 5 MG/5 ML
SYRINGE (ML) INTRAVENOUS AS NEEDED
Status: DISCONTINUED | OUTPATIENT
Start: 2020-12-30 | End: 2020-12-30 | Stop reason: SURG

## 2020-12-30 RX ORDER — LIDOCAINE HYDROCHLORIDE 10 MG/ML
0.5 INJECTION, SOLUTION EPIDURAL; INFILTRATION; INTRACAUDAL; PERINEURAL ONCE AS NEEDED
Status: DISCONTINUED | OUTPATIENT
Start: 2020-12-30 | End: 2020-12-30 | Stop reason: SDUPTHER

## 2020-12-30 RX ORDER — SODIUM CHLORIDE 0.9 % (FLUSH) 0.9 %
3-10 SYRINGE (ML) INJECTION AS NEEDED
Status: DISCONTINUED | OUTPATIENT
Start: 2020-12-30 | End: 2020-12-30 | Stop reason: HOSPADM

## 2020-12-30 RX ORDER — ONDANSETRON 2 MG/ML
INJECTION INTRAMUSCULAR; INTRAVENOUS AS NEEDED
Status: DISCONTINUED | OUTPATIENT
Start: 2020-12-30 | End: 2020-12-30 | Stop reason: SURG

## 2020-12-30 RX ORDER — SODIUM CHLORIDE, SODIUM LACTATE, POTASSIUM CHLORIDE, CALCIUM CHLORIDE 600; 310; 30; 20 MG/100ML; MG/100ML; MG/100ML; MG/100ML
1000 INJECTION, SOLUTION INTRAVENOUS CONTINUOUS
Status: DISCONTINUED | OUTPATIENT
Start: 2020-12-30 | End: 2020-12-30 | Stop reason: HOSPADM

## 2020-12-30 RX ORDER — DEXAMETHASONE SODIUM PHOSPHATE 4 MG/ML
4 INJECTION, SOLUTION INTRA-ARTICULAR; INTRALESIONAL; INTRAMUSCULAR; INTRAVENOUS; SOFT TISSUE ONCE AS NEEDED
Status: COMPLETED | OUTPATIENT
Start: 2020-12-30 | End: 2020-12-30

## 2020-12-30 RX ORDER — LIDOCAINE HYDROCHLORIDE 10 MG/ML
0.5 INJECTION, SOLUTION EPIDURAL; INFILTRATION; INTRACAUDAL; PERINEURAL ONCE AS NEEDED
Status: DISCONTINUED | OUTPATIENT
Start: 2020-12-30 | End: 2020-12-30 | Stop reason: HOSPADM

## 2020-12-30 RX ORDER — LIDOCAINE HYDROCHLORIDE AND EPINEPHRINE 10; 10 MG/ML; UG/ML
INJECTION, SOLUTION INFILTRATION; PERINEURAL AS NEEDED
Status: DISCONTINUED | OUTPATIENT
Start: 2020-12-30 | End: 2020-12-30 | Stop reason: HOSPADM

## 2020-12-30 RX ORDER — ONDANSETRON 2 MG/ML
4 INJECTION INTRAMUSCULAR; INTRAVENOUS ONCE AS NEEDED
Status: DISCONTINUED | OUTPATIENT
Start: 2020-12-30 | End: 2020-12-30 | Stop reason: HOSPADM

## 2020-12-30 RX ADMIN — MIDAZOLAM 1 MG: 1 INJECTION INTRAMUSCULAR; INTRAVENOUS at 08:02

## 2020-12-30 RX ADMIN — PROPOFOL 200 MG: 10 INJECTION, EMULSION INTRAVENOUS at 08:40

## 2020-12-30 RX ADMIN — FENTANYL CITRATE 50 MCG: 50 INJECTION INTRAMUSCULAR; INTRAVENOUS at 10:01

## 2020-12-30 RX ADMIN — ONDANSETRON HYDROCHLORIDE 4 MG: 2 SOLUTION INTRAMUSCULAR; INTRAVENOUS at 09:57

## 2020-12-30 RX ADMIN — DEXAMETHASONE SODIUM PHOSPHATE 4 MG: 4 INJECTION, SOLUTION INTRAMUSCULAR; INTRAVENOUS at 08:02

## 2020-12-30 RX ADMIN — Medication 2 SPRAY: at 07:58

## 2020-12-30 RX ADMIN — Medication 3 MG: at 09:57

## 2020-12-30 RX ADMIN — MIDAZOLAM 1 MG: 1 INJECTION INTRAMUSCULAR; INTRAVENOUS at 08:19

## 2020-12-30 RX ADMIN — FENTANYL CITRATE 50 MCG: 50 INJECTION INTRAMUSCULAR; INTRAVENOUS at 09:05

## 2020-12-30 RX ADMIN — SODIUM CHLORIDE, POTASSIUM CHLORIDE, SODIUM LACTATE AND CALCIUM CHLORIDE 1000 ML: 600; 310; 30; 20 INJECTION, SOLUTION INTRAVENOUS at 06:46

## 2020-12-30 RX ADMIN — LIDOCAINE HYDROCHLORIDE 100 MG: 20 INJECTION, SOLUTION INTRAVENOUS at 08:40

## 2020-12-30 RX ADMIN — ACETAMINOPHEN 1000 MG: 500 TABLET, FILM COATED ORAL at 08:02

## 2020-12-30 RX ADMIN — OXYCODONE HYDROCHLORIDE AND ACETAMINOPHEN 1 TABLET: 10; 325 TABLET ORAL at 10:33

## 2020-12-30 RX ADMIN — FENTANYL CITRATE 100 MCG: 50 INJECTION INTRAMUSCULAR; INTRAVENOUS at 08:40

## 2020-12-30 RX ADMIN — LIDOCAINE HYDROCHLORIDE 1 EACH: 40 SOLUTION TOPICAL at 08:42

## 2020-12-30 RX ADMIN — FAMOTIDINE 20 MG: 10 INJECTION INTRAVENOUS at 08:02

## 2020-12-30 RX ADMIN — FENTANYL CITRATE 50 MCG: 50 INJECTION INTRAMUSCULAR; INTRAVENOUS at 08:53

## 2020-12-30 RX ADMIN — DEXAMETHASONE SODIUM PHOSPHATE 8 MG: 4 INJECTION, SOLUTION INTRAMUSCULAR; INTRAVENOUS at 09:01

## 2020-12-30 RX ADMIN — ROCURONIUM BROMIDE 30 MG: 10 INJECTION INTRAVENOUS at 08:40

## 2020-12-30 RX ADMIN — SODIUM CHLORIDE, POTASSIUM CHLORIDE, SODIUM LACTATE AND CALCIUM CHLORIDE: 600; 310; 30; 20 INJECTION, SOLUTION INTRAVENOUS at 10:02

## 2020-12-30 RX ADMIN — GLYCOPYRROLATE 0.4 MG: 0.2 INJECTION, SOLUTION INTRAMUSCULAR; INTRAVENOUS at 09:57

## 2020-12-30 NOTE — ANESTHESIA PREPROCEDURE EVALUATION
Anesthesia Evaluation     Patient summary reviewed and Nursing notes reviewed   no history of anesthetic complications:  NPO Solid Status: > 8 hours  NPO Liquid Status: > 8 hours           Airway   Mallampati: II  TM distance: >3 FB  Neck ROM: full  No difficulty expected  Dental      Pulmonary    (+) asthma (mild, no recent issues),  (-) not a smoker  Cardiovascular   Exercise tolerance: good (4-7 METS)    (-) hypertension, hyperlipidemia      Neuro/Psych  (+) psychiatric history Anxiety,     (-) seizures, TIA, CVA  GI/Hepatic/Renal/Endo    (-) liver disease, no renal disease, diabetes    Musculoskeletal     Abdominal    Substance History      OB/GYN          Other                        Anesthesia Plan    ASA 2     general     intravenous induction     Anesthetic plan, all risks, benefits, and alternatives have been provided, discussed and informed consent has been obtained with: patient.

## 2020-12-30 NOTE — ANESTHESIA PROCEDURE NOTES
Airway  Urgency: elective    Date/Time: 12/30/2020 8:42 AM  Airway not difficult    General Information and Staff    Patient location during procedure: OR  CRNA: Aleida Espinoza CRNA    Indications and Patient Condition  Indications for airway management: airway protection    Preoxygenated: yes  Mask difficulty assessment: 1 - vent by mask    Final Airway Details  Final airway type: endotracheal airway      Successful airway: ETT  Cuffed: yes   Successful intubation technique: direct laryngoscopy  Facilitating devices/methods: intubating stylet  Endotracheal tube insertion site: oral  Blade: Nunez  Blade size: 2 (3.5)  ETT size (mm): 7.0  Cormack-Lehane Classification: grade IIa - partial view of glottis  Placement verified by: chest auscultation and capnometry   Cuff volume (mL): 5  Measured from: lips  ETT/EBT  to lips (cm): 22  Number of attempts at approach: 1  Assessment: lips, teeth, and gum same as pre-op and atraumatic intubation

## 2020-12-30 NOTE — ANESTHESIA POSTPROCEDURE EVALUATION
Patient: Caprice Thibodeaux    Procedure Summary     Date: 12/30/20 Room / Location:  [facility] OR  /  [facility] OR    Anesthesia Start: 0835 Anesthesia Stop: 1011    Procedure: Bilateral functional endoscopic anterior ethmoidectomy with bilateral middle meatal antrostomies Septoplasty Bilateral bib bullosa resection Bilateral inferior turbinate reduction via Coblation Resection of right nasal polyposis (Bilateral Nose) Diagnosis:       Chronic pansinusitis      Nasal septal deviation      Bib bullosa      Hypertrophy of both inferior nasal turbinates      Seasonal allergic rhinitis due to pollen      (Chronic pansinusitis [J32.4])      (Nasal septal deviation [J34.2])      (Bib bullosa [J34.89])      (Hypertrophy of both inferior nasal turbinates [J34.3])      (Seasonal allergic rhinitis due to pollen [J30.1])    Surgeon: David Snyder MD Provider: Nita Perdomo CRNA    Anesthesia Type: general ASA Status: 2          Anesthesia Type: general    Vitals  Vitals Value Taken Time   /94 12/30/20 1041   Temp 99.7 °F (37.6 °C) 12/30/20 1050   Pulse 80 12/30/20 1050   Resp 20 12/30/20 1050   SpO2 94 % 12/30/20 1050           Post Anesthesia Care and Evaluation    Patient location during evaluation: PACU  Patient participation: complete - patient participated  Level of consciousness: awake and alert  Pain management: adequate  Airway patency: patent  Anesthetic complications: No anesthetic complications    Cardiovascular status: acceptable  Respiratory status: acceptable  Hydration status: acceptable    Comments: Blood pressure 133/78, pulse 74, temperature 99.7 °F (37.6 °C), temperature source Temporal, resp. rate 18, last menstrual period 12/12/2020, SpO2 94 %, not currently breastfeeding.    Pt discharged from PACU based on arnulfo score >8  No anesthesia care post op

## 2020-12-31 LAB
LAB AP CASE REPORT: NORMAL
PATH REPORT.FINAL DX SPEC: NORMAL
PATH REPORT.GROSS SPEC: NORMAL

## 2021-01-06 ENCOUNTER — HOSPITAL ENCOUNTER (OUTPATIENT)
Dept: ULTRASOUND IMAGING | Age: 21
Discharge: HOME | End: 2021-01-06
Payer: COMMERCIAL

## 2021-01-06 DIAGNOSIS — E01.0 THYROMEGALY: ICD-10-CM

## 2021-01-06 PROCEDURE — 76536 US EXAM OF HEAD AND NECK: CPT

## 2021-01-08 LAB
C. TRACHOMATIS DNA,THIN PREP: NEGATIVE
N. GONORRHOEAE DNA, THIN PREP: NEGATIVE

## 2021-01-18 ENCOUNTER — TRANSCRIBE ORDERS (OUTPATIENT)
Dept: LAB | Facility: HOSPITAL | Age: 21
End: 2021-01-18

## 2021-01-18 DIAGNOSIS — Z01.818 PRE-OP TESTING: Primary | ICD-10-CM

## 2021-01-22 ENCOUNTER — OFFICE VISIT (OUTPATIENT)
Dept: OTOLARYNGOLOGY | Facility: CLINIC | Age: 21
End: 2021-01-22

## 2021-01-22 VITALS
HEART RATE: 61 BPM | TEMPERATURE: 98.2 F | HEIGHT: 69 IN | SYSTOLIC BLOOD PRESSURE: 144 MMHG | DIASTOLIC BLOOD PRESSURE: 81 MMHG | WEIGHT: 195.2 LBS | BODY MASS INDEX: 28.91 KG/M2

## 2021-01-22 DIAGNOSIS — J30.1 SEASONAL ALLERGIC RHINITIS DUE TO POLLEN: ICD-10-CM

## 2021-01-22 DIAGNOSIS — Z98.890 S/P SINUS SURGERY: Primary | ICD-10-CM

## 2021-01-22 PROBLEM — J01.91 ACUTE RECURRENT SINUSITIS: Status: RESOLVED | Noted: 2020-08-28 | Resolved: 2021-01-22

## 2021-01-22 PROBLEM — J32.4 CHRONIC PANSINUSITIS: Status: RESOLVED | Noted: 2020-08-28 | Resolved: 2021-01-22

## 2021-01-22 PROBLEM — J34.3 HYPERTROPHY OF BOTH INFERIOR NASAL TURBINATES: Status: RESOLVED | Noted: 2020-12-08 | Resolved: 2021-01-22

## 2021-01-22 PROBLEM — J34.89 CONCHA BULLOSA: Chronic | Status: RESOLVED | Noted: 2020-12-08 | Resolved: 2021-01-22

## 2021-01-22 PROBLEM — J34.2 NASAL SEPTAL DEVIATION: Status: RESOLVED | Noted: 2020-12-08 | Resolved: 2021-01-22

## 2021-01-22 PROCEDURE — 99024 POSTOP FOLLOW-UP VISIT: CPT | Performed by: PHYSICIAN ASSISTANT

## 2021-01-22 PROCEDURE — 31237 NSL/SINS NDSC SURG BX POLYPC: CPT | Performed by: PHYSICIAN ASSISTANT

## 2021-01-22 NOTE — PROGRESS NOTES
QUINN Girard     Chief Complaint   Patient presents with   • Post-op     sinus sx        HISTORY OF PRESENT ILLNESS:     Caprice Thibodeaux is a  21 y.o.  female who presents for follow up s/p bilateral functional endoscopic anterior ethmoidectomy with bilateral middle meatal antrostomies, septoplasty, bilateral bib bullosa resection, bilateral inferior turbinate reduction via coblation, resection of right nasal polyposis. The patient has had a relatively normal postoperative course and currently has no related complaints.    Review of Systems   Constitutional: Negative for activity change, appetite change, chills, diaphoresis, fatigue, fever and unexpected weight change.   HENT: Positive for congestion and postnasal drip. Negative for dental problem, drooling, ear discharge, ear pain, facial swelling, hearing loss, mouth sores, nosebleeds, rhinorrhea, sinus pressure, sneezing, sore throat, tinnitus, trouble swallowing and voice change.         S/P sinus surgery   Eyes: Negative.    Respiratory: Negative.    Cardiovascular: Negative.    Gastrointestinal: Negative.    Endocrine: Negative.    Skin: Negative.    Allergic/Immunologic: Positive for environmental allergies. Negative for food allergies and immunocompromised state.   Neurological: Negative.    Hematological: Negative.    Psychiatric/Behavioral: Negative.    :    Past History:  Past Medical History:   Diagnosis Date   • Anxiety    • Asthma    • Enlarged thyroid    • Sinusitis      Past Surgical History:   Procedure Laterality Date   • ENDOSCOPIC FUNCTIONAL SINUS SURGERY (FESS) Bilateral 12/30/2020    Procedure: Bilateral functional endoscopic anterior ethmoidectomy with bilateral middle meatal antrostomies Septoplasty Bilateral bib bullosa resection Bilateral inferior turbinate reduction via Coblation Resection of right nasal polyposis;  Surgeon: David Snyder MD;  Location: [facility];  Service: ENT;  Laterality: Bilateral;   • WISDOM  TOOTH EXTRACTION       Family History   Problem Relation Age of Onset   • Hyperlipidemia Mother    • Diabetes Father    • Hypertension Father      Social History     Tobacco Use   • Smoking status: Never Smoker   • Smokeless tobacco: Never Used   Substance Use Topics   • Alcohol use: Not Currently     Comment: mainly on weekends   • Drug use: Never     Outpatient Medications Marked as Taking for the 1/22/21 encounter (Office Visit) with Maxi Breen PA   Medication Sig Dispense Refill   • albuterol sulfate  (90 Base) MCG/ACT inhaler Inhale 2 puffs Every 6 (Six) Hours As Needed for Wheezing.     • hydrOXYzine pamoate (VISTARIL) 25 MG capsule Take 25 mg by mouth 3 (Three) Times a Day As Needed for Anxiety.     • LO LOESTRIN FE 1 MG-10 MCG / 10 MCG tablet Take 1 tablet by mouth Daily.     • montelukast (SINGULAIR) 10 MG tablet Take 1 tablet by mouth Daily. 30 tablet 11   • mupirocin (BACTROBAN) 2 % ointment      • olopatadine (PATANASE) 0.6 % solution nasal solution 2 sprays by Each Nare route 2 (Two) Times a Day. 1 bottle 11     Allergies:  Patient has no known allergies.          Vital Signs:   Vitals:    01/22/21 1401   BP: 144/81   Pulse: 61   Temp: 98.2 °F (36.8 °C)         EXAMINATION:   CONSTITUTIONAL: well nourished, alert, oriented, in no acute distress     COMMUNICATION AND VOICE: able to communicate normally, normal voice quality    HEAD: normocephalic, no lesions, atraumatic, no tenderness, no masses     FACE: appearance normal, no lesions, no tenderness, no deformities, facial motion symmetric    EYES: ocular motility normal, eyelids normal, orbits normal, no proptosis, conjunctiva normal , pupils equal, round     EARS:  Hearing: response to conversational voice normal bilaterally   External Ears: auricles without lesions    NOSE:  External Nose: structure normal, no tenderness on palpation, no nasal discharge, no lesions, no evidence of trauma, nostrils patent   Intranasal Exam: see  procedure note    ORAL:  Lips: upper and lower lips without lesion     NECK: neck appearance normal    CHEST/RESPIRATORY: respiratory effort normal, normal breath sounds     CARDIOVASCULAR: rate and rhythm normal, extremities without cyanosis or edema      NEUROLOGIC/PSYCHIATRIC: oriented to time, place and person, mood normal, affect appropriate, CN II-XII intact grossly    RESULTS REVIEW:    I have reviewed the patients old records in the chart.       Assessment    Diagnosis Plan   1. S/P sinus surgery  mupirocin (BACTROBAN) 2 % ointment   2. Seasonal allergic rhinitis due to pollen         Plan    Patient Instructions   May resume nasal sprays and blowing nose, resume normal activity, advised to continue bactroban ointment in the nose in the evenings and saline spray as needed. Recheck in 6 weeks.               Return in about 6 weeks (around 3/5/2021) for Recheck sinuses.    QUINN Girard  01/22/21  14:22 CST

## 2021-01-22 NOTE — PATIENT INSTRUCTIONS
May resume nasal sprays and blowing nose, resume normal activity, advised to continue bactroban ointment in the nose in the evenings and saline spray as needed. Recheck in 6 weeks.

## 2021-01-22 NOTE — PROGRESS NOTES
Procedure Note    Pre-operative Diagnosis:  s/p recent sinonasal surgery    Post-operative Diagnosis: same    Anesthesia: topical 4% tetracaine and oxymetazoline mix    Endoscopy Type:  Nasal Endoscopy with debridement    Procedure Details:    After topical anesthesia and decongestion, the patient was placed in the sitting position.  An endoscope was passed along the left nasal floor to the nasopharynx.  It was then passed into the region of the middle meatus, middle turbinate, and the sphenoethmoid region. Debridement of postoperative crusting and removal of old blood and retained secretions was performed with forceps and suctioning. An identical procedure was performed on the right side.  The following findings were noted:    Findings:  Intranasal Exam: mucosa normal, vestibule within normal limits, inferior turbinate normal, anterior nasal septum non-obstructing, no polyps seen, propel stents in place bilaterally    Condition:  Stable.  Patient tolerated procedure well.    Complications:  None

## 2021-01-25 NOTE — PROGRESS NOTES
David Snyder MD   I have seen and/or examined Caprice Thibodeaux and have reviewed the notes, assessments, and/or procedures and I concur with this documentation.    David Snyder MD, FACS  01/25/21  9:53 AM CST

## 2021-03-03 ENCOUNTER — TRANSCRIBE ORDERS (OUTPATIENT)
Dept: LAB | Facility: HOSPITAL | Age: 21
End: 2021-03-03

## 2021-03-08 ENCOUNTER — OFFICE VISIT (OUTPATIENT)
Dept: OTOLARYNGOLOGY | Facility: CLINIC | Age: 21
End: 2021-03-08

## 2021-03-08 VITALS
BODY MASS INDEX: 28.41 KG/M2 | DIASTOLIC BLOOD PRESSURE: 84 MMHG | WEIGHT: 191.8 LBS | SYSTOLIC BLOOD PRESSURE: 119 MMHG | HEART RATE: 78 BPM | TEMPERATURE: 97.8 F | HEIGHT: 69 IN

## 2021-03-08 DIAGNOSIS — J30.1 SEASONAL ALLERGIC RHINITIS DUE TO POLLEN: Primary | ICD-10-CM

## 2021-03-08 DIAGNOSIS — Z98.890 S/P SINUS SURGERY: ICD-10-CM

## 2021-03-08 PROCEDURE — 99024 POSTOP FOLLOW-UP VISIT: CPT | Performed by: PHYSICIAN ASSISTANT

## 2021-03-08 NOTE — PATIENT INSTRUCTIONS
Continue treatment as directed, if sinus allergy symptoms develop advised to start using nasal sprays daily, if symptoms develop call for sooner appointment, otherwise follow-up as directed.    The patient understands and agrees with assessment and plan.

## 2021-03-08 NOTE — PROGRESS NOTES
QUINN Girard     Chief Complaint   Patient presents with   • Follow-up        HISTORY OF PRESENT ILLNESS:     Caprice Thibodeaux is a  21 y.o.  female who presents for follow up s/p bilateral functional endoscopic anterior ethmoidectomy with bilateral middle meatal antrostomies, septoplasty, bilateral bib bullosa resection, bilateral inferior turbinate reduction via coblation, resection of right nasal polyposis on 12/30/20 by Dr. Snyder. The patient is here for her second follow-up appointment status post sinus surgery. She reports she is doing well without complaints and has only been using nasal sprays off and on. The patient denies any other ENT complaints at this time.    Review of Systems   Constitutional: Negative for activity change, appetite change, chills, diaphoresis, fatigue, fever and unexpected weight change.   HENT: Negative for congestion, dental problem, drooling, ear discharge, ear pain, facial swelling, hearing loss, mouth sores, nosebleeds, postnasal drip, rhinorrhea, sinus pressure, sneezing, sore throat, tinnitus, trouble swallowing and voice change.         S/P sinus surgery   Eyes: Negative.    Respiratory: Negative.    Cardiovascular: Negative.    Gastrointestinal: Negative.    Endocrine: Negative.    Skin: Negative.    Allergic/Immunologic: Positive for environmental allergies. Negative for food allergies and immunocompromised state.   Neurological: Negative.    Hematological: Negative.    Psychiatric/Behavioral: Negative.    :    Past History:  Past Medical History:   Diagnosis Date   • Anxiety    • Asthma    • Enlarged thyroid    • Sinusitis      Past Surgical History:   Procedure Laterality Date   • ENDOSCOPIC FUNCTIONAL SINUS SURGERY (FESS) Bilateral 12/30/2020    Procedure: Bilateral functional endoscopic anterior ethmoidectomy with bilateral middle meatal antrostomies Septoplasty Bilateral bib bullosa resection Bilateral inferior turbinate reduction via Coblation Resection  of right nasal polyposis;  Surgeon: David Snyder MD;  Location: [facility];  Service: ENT;  Laterality: Bilateral;   • WISDOM TOOTH EXTRACTION       Family History   Problem Relation Age of Onset   • Hyperlipidemia Mother    • Diabetes Father    • Hypertension Father      Social History     Tobacco Use   • Smoking status: Never Smoker   • Smokeless tobacco: Never Used   Vaping Use   • Vaping Use: Never used   Substance Use Topics   • Alcohol use: Not Currently     Comment: mainly on weekends   • Drug use: Never     Outpatient Medications Marked as Taking for the 3/8/21 encounter (Office Visit) with Maxi Breen PA   Medication Sig Dispense Refill   • albuterol sulfate  (90 Base) MCG/ACT inhaler Inhale 2 puffs Every 6 (Six) Hours As Needed for Wheezing.     • hydrOXYzine pamoate (VISTARIL) 25 MG capsule Take 25 mg by mouth 3 (Three) Times a Day As Needed for Anxiety.     • LO LOESTRIN FE 1 MG-10 MCG / 10 MCG tablet Take 1 tablet by mouth Daily.     • montelukast (SINGULAIR) 10 MG tablet Take 1 tablet by mouth Daily. 30 tablet 11   • mupirocin (BACTROBAN) 2 % ointment      • olopatadine (PATANASE) 0.6 % solution nasal solution 2 sprays by Each Nare route 2 (Two) Times a Day. 1 bottle 11     Allergies:  Patient has no known allergies.          Vital Signs:   Vitals:    03/08/21 0903   BP: 119/84   Pulse: 78   Temp: 97.8 °F (36.6 °C)         EXAMINATION:   CONSTITUTIONAL: well nourished, alert, oriented, in no acute distress     COMMUNICATION AND VOICE: able to communicate normally, normal voice quality    HEAD: normocephalic, no lesions, atraumatic, no tenderness, no masses     FACE: appearance normal, no lesions, no tenderness, no deformities, facial motion symmetric    EYES: ocular motility normal, eyelids normal, orbits normal, no proptosis, conjunctiva normal , pupils equal, round     EARS:  Hearing: response to conversational voice normal bilaterally   External Ears: auricles without  lesions    NOSE:  External Nose: structure normal, no tenderness on palpation, no nasal discharge, no lesions, no evidence of trauma, nostrils patent   Intranasal exam: nasal mucosa with mild erythema, nasal vestibule normal, inferior turbinates are normal, nasal septum is midline    ORAL:  Lips: upper and lower lips without lesion     NECK: neck appearance normal    CHEST/RESPIRATORY: respiratory effort normal, normal breath sounds     CARDIOVASCULAR: rate and rhythm normal, extremities without cyanosis or edema      NEUROLOGIC/PSYCHIATRIC: oriented to time, place and person, mood normal, affect appropriate, CN II-XII intact grossly    RESULTS REVIEW:    I have reviewed the patients old records in the chart.       Assessment    Diagnosis Plan   1. Seasonal allergic rhinitis due to pollen     2. S/P sinus surgery         Plan    Patient Instructions   Continue treatment as directed, if sinus allergy symptoms develop advised to start using nasal sprays daily, if symptoms develop call for sooner appointment, otherwise follow-up as directed.    The patient understands and agrees with assessment and plan.                 Return in about 3 months (around 6/8/2021) for Recheck sinuses.    QUINN Girard  03/08/21  09:13 CST

## 2021-03-10 NOTE — PROGRESS NOTES
David Snyder MD   I have seen and/or examined Caprice Thibodeaux and have reviewed the notes, assessments, and/or procedures and I concur with this documentation.    David Snyder MD, FACS  03/10/21  7:04 AM CST

## 2021-03-12 NOTE — PROGRESS NOTES
"  MEDICARE WELLNESS VISIT NOTE    History of Present Illness:   Carlos Morales presents for her {INITIAL:143829} Medicare Wellness Visit. She has {ADULT CONCERNS:948820::""no current complaints or concerns\""}. Diabetes: Patient is here for follow up of type 2 diabetes with the following complications: None. Current anti-diabetic medications: Metformin. Is *** taking a statin. Is *** taking an ACEI or ARB. Is *** taking a daily baby aspirin. Does *** check sugars at home, readings are ***. Is *** following a low carb/ diabetic diet. Does *** get regular exercise. HLD: Patient is *** taking a statin. *** tolerating medication well. Is *** following a low cholesterol diet, *** regularly exercising, *** limiting alcohol consumption. Denies chest pain, SOB, anginal symptoms, myalgias, changes in stool/ urine color. Hypothyroidism: Currently taking levothyroxine *** mcg. Has *** historically been stable at this dose. Denies weight changes, palpitations, chest pain, shortness of breath, fatigue, worsening mood, constipation, skin/ hair changes. Mood: Patient has been diagnosed with ***. Since the last visit the symptoms are {AEL Improvement Descriptors:634858}. [unfilled] indicates that @HE@ {AEL Is/ Is not:701761} taking medications as prescribed. Side effects related to the medication: ***. Current stress factors in life include: ***. Suicidal thoughts: {AEL Suicidal thoughts:144911}. Currently seeing counselor: {YES-No:60}. Denies self-harm, difficulty sleeping, tremor, weight changes. Osteopenia: Patient has diagnosed {AEL Osteo:216399}. Patient {AEL Is/ Is not:679467} {AEL Osteo [id number]. {AEL Has/ Has DSL:730602} had any fractures in the past year. Is *** currently taking prescription medication for osteo***. Last DEXA scan ***. Breast cancer:    Reproductive: Currently using *** for birth control. {AEL Is/ Is not:893697} happy with current birth control method.  {AEL Is/ Is TORSTEN:611512} " SUBJECTIVE:  Shun Gay is a 24 y.o. who presents today for Established New Doctor (wants to get established, wants to disucss anxiety)      KENYA Escalantey presents today to establish care. She is currently residing in [facility], attending [employer] for a business degree. She is home for [facility] and will resume classes in January. She tells me that over the last few months, really since starting school, she has had trouble falling asleep. She tells me that she has trouble getting her mind to calm down, constantly thinking about things and will still be awake at 2-3am.  She is able to get herself up for class, but feels tired during the day. Since she has been home, she still has trouble falling asleep, but is able to sleep in so she does feel better during the day. No s/sx of depression, no SI or HI. No prior history of treatment for anxiety, depression or insomnia. She had normal TSH in June by GYN. She has had intermittent head congestion since October. She did visit urgent care twice due to symptoms, had strep and took medrol and zpak. She continues with only intermittent congestion. She continues with colored sputum at times. She has underlying history of childhood asthma that she has not been bothered by recently. She has zyrtec type of medication, but is not taking regular. Dr Valencia Gómez for prior immunizations, she reports being UTD, we will request records. She saw GYN earlier this year for irregular menstruation, but labs were WNL. She is not sexually active. Past Medical History:   Diagnosis Date    Asthma     Generalized anxiety disorder     Insomnia      History reviewed. No pertinent surgical history.   Family History   Problem Relation Age of Onset    Diabetes Father     Heart Failure Maternal Grandmother     Heart Failure Maternal Grandfather     Heart Failure Paternal Grandmother     Stroke Paternal Grandmother     Diabetes Paternal Grandmother     Heart sexually active with {AEL Partner:151973}. *** desire STD screening today. Menses are *** regular. LMP: ***. Denies heavy periods, vaginal discharge, pelvic pain, missing periods, vaginal odor, painful periods. Patient Care Team:  Mike spears PCP - General (Physician Assistant)  Crispin Poon MD (Gastroenterology)        Patient Active Problem List    Diagnosis Date Noted   â¢ Allergic rhinitis due to pollen 01/07/2016     Priority: Medium   â¢ Allergic rhinitis due to animal (cat) (dog) hair and dander 01/07/2016     Priority: Medium   â¢ Allergic rhinitis due to dust mite 01/07/2016     Priority: Medium   â¢ Hypothyroidism      Priority: Low   â¢ Gastroesophageal reflux disease      Priority: Low   â¢ Breast cancer (CMS/[facility])      Priority: Low   â¢ Skin cancer 06/19/2018     Priority: Low   â¢ Osteopenia 12/07/2016     Priority: Low     Per DEXA bone scan on 11/21/16 at [facility]. â¢ Atypical chest pain 04/08/2016     Priority: Low   â¢ Bee sting reaction 01/07/2016     Priority: Low   â¢ Allergic conjunctivitis 01/07/2016     Priority: Low   â¢ Hyperlipidemia 03/31/2015     Priority: Low   â¢ Ptosis, mechanical 10/29/2014     Priority: Low   â¢ VFD (visual field defect) 10/29/2014     Priority: Low   â¢ Senile nuclear sclerosis 08/20/2014     Priority: Low   â¢ Personal history of cervical dysplasia 10/12/2012     Priority: Low   â¢ Alcohol abuse 10/05/2012     Priority: Low   â¢ Anxiety 10/05/2012     Priority: Low   â¢ Arthritis 10/05/2012     Priority: Low   â¢ Malignant neoplasm of lower-outer quadrant of left female breast (CMS/[facility]) 10/05/2012     Priority: Low     Â· Triple negative breast cancer diagnosed after an abnormality behind the left nipple was found on a screening mammogram  Â· Ultrasound confirmed an 8.3 mm hypoechoic solid mass, and an ultrasound-guided core biopsy was performed with clip placement.    Â· The pathology revealed a poorly differentiated 1 cm invasive ductal carcinoma without angiolymphatic invasion, grade 3, along with solid DCIS, high-grade. ER and MD status were negative. HER2 status was also negative by IHC. Â· Partial mastectomy completed along with SLN biopsy. Pathology revealed a 0.9 cm poorly differentiated invasive ductal carcinoma, ER/MD negative, Her 2 esperanza negative at 1+ by IHC. SLN 2/2 negative. Stage [id number], stage I.  Â· CMF chemotherapy started 10/18/2011; completed 4 cycles 2011. Â· Adjuvant radiation therapy started 2012 and completed in 3/2012. Â· Undergoing surveillance         â¢ Depression 10/05/2012     Priority: Low   â¢ Diabetes mellitus (CMS/HCC) 10/05/2012     Priority: Low     Adult onset      â¢ Migraines 10/05/2012     Priority: Low         Past Medical History:   Diagnosis Date   â¢ Abnormal Pap smear of cervix 2020    Low grade squamous intraepithelial lesion (LSIL). â¢ Anxiety    â¢ Arthritis    â¢ Breast cancer (CMS/[facility])    â¢ Depressive disorder    â¢ Diabetes mellitus (CMS/[facility])    â¢ Gastroesophageal reflux disease    â¢ High cholesterol    â¢ Hypothyroidism          Past Surgical History:   Procedure Laterality Date   â¢ Breast lumpectomy  2011    Lumpectomy   â¢  section, low transverse      x4   â¢ Colonoscopy  12/17/15    Repeat 5 years () for hx: colon polyps   â¢ Colposcopy,loop electrd cervix excis  2011    LEEP   â¢ Colposcopy,loop electrd cervix excis  2008    LEEP   â¢ Hysterectomy  10/20/2020   â¢ Lasik surgery Bilateral     Dr. Fozia Smith about 20 years ago   â¢ Left heart cath,percutaneous  2004    Cardiac Cath   â¢ Rotator cuff repair  2007    left   â¢ Skin cancer excision     â¢ Umbilical hernia repair  05/10/2005         Social History     Tobacco Use   â¢ Smoking status: Former Smoker     Packs/day: 0.50     Years: 5.00     Pack years: 2.50     Types: Cigarettes     Quit date: 1975     Years since quittin.2   â¢ Smokeless tobacco: Never Used   Substance Use Topics   â¢ Alcohol use:  Yes well-nourished. HENT:   Head: Normocephalic and atraumatic. Mouth/Throat: Oropharynx is clear and moist.   Eyes: Conjunctivae are normal. Pupils are equal, round, and reactive to light. Right eye exhibits no discharge. Left eye exhibits no discharge. Neck: Neck supple. No tracheal deviation present. No thyromegaly present. Cardiovascular: Normal rate and regular rhythm. No murmur heard. Pulmonary/Chest: Effort normal and breath sounds normal. No respiratory distress. She has no wheezes. She has no rales. Musculoskeletal: Normal range of motion. She exhibits no deformity. Lymphadenopathy:     She has no cervical adenopathy. Neurological: She is alert and oriented to person, place, and time. No cranial nerve deficit. Skin: Skin is warm and dry. No rash noted. No erythema. Psychiatric: She has a normal mood and affect. Thought content normal.   Vitals reviewed. ASSESSMENT/PLAN:  1. Psychophysiological insomnia  Discussed buspar to use prn or scheduled, side effects and efficacy  - busPIRone (BUSPAR) 10 MG tablet; Take 1 tablet by mouth 2 times daily as needed (anxiety)  Dispense: 60 tablet; Refill: 1    2. Generalized anxiety disorder  - busPIRone (BUSPAR) 10 MG tablet; Take 1 tablet by mouth 2 times daily as needed (anxiety)  Dispense: 60 tablet; Refill: 1    3. Upper respiratory tract infection, unspecified type  Continue zyrtec once daily, recommend flonase 1 spray each nostril twice daily  Avoid triggers  - amoxicillin-clavulanate (AUGMENTIN) 500-125 MG per tablet; Take 1 tablet by mouth 2 times daily for 7 days  Dispense: 14 tablet; Refill: 0         Return in about 3 months (around 3/14/2018). Alcohol/week: 7.0 standard drinks     Types: 7 Cans of beer per week     Frequency: 4 or more times a week     Drinks per session: 1 or 2     Binge frequency: Never     Comment: Occas   â¢ Drug use: No     Comment: Soda 1 per day     Drug use:    Drug Use:    No                 Comment: Soda 1 per day    {Family History:8661668}    Current Outpatient Medications   Medication Sig Dispense Refill   â¢ blood glucose test strip Test blood sugar 2 times daily as directed. Diagnosis: DM type 2 non insulin dependent. Meter: insurance preferred brand. 200 strip 3   â¢ blood glucose meter Test blood sugar 2 times daily as directed. Diagnosis: DM type 2 non insulin dependent. Meter: Of patient / pharmacist choice 1 kit 0   â¢ blood glucose lancets Test blood sugar two times daily as directed. Diagnosis: diabetes mellitus type 2 non insulin dependent. Meter: as preferred by patient's insurance. 100 each 3   â¢ metFORMIN (GLUCOPHAGE-XR) 500 MG 24 hr tablet Take 2 tablets by mouth 2 times daily. (Patient taking differently: Take 1,000 mg by mouth daily. ) 360 tablet 1   â¢ atorvastatin (LIPITOR) 20 MG tablet Take 1 tablet by mouth daily. 90 tablet 1   â¢ levothyroxine 100 MCG tablet Take 1 tablet by mouth daily. 90 tablet 1   â¢ nystatin (MYCOSTATIN) 494725 UNIT/GM powder Use BID daily prn for skin yeast infection 90 g 4   â¢ EPIPEN 2-PRERNA 0.3 MG/0.3ML auto-injector Inject 0.3 mLs into the muscle as needed (USE ONLY AS INSTRUCTED). 1 each 0   â¢ coenzyme Q10 100 MG capsule Take 1 capsule by mouth daily. â¢ Cholecalciferol (VITAMIN D3) 5000 units Tab Take 2 tablets by mouth daily. 30 tablet    â¢ Probiotic Product (PROBIOTIC DAILY PO) Take 1 tablet by mouth daily. â¢ diphenhydrAMINE (Benadryl) 25 MG tablet Take 25 mg by mouth as needed for Sleep. 30 tablet 0   â¢ Daily Multiple Vitamins TABS Take by mouth daily. WOMEN OVER 50 30 tablet    â¢ electrolyte/PEG 3350 (MoviPrep) 100 g solution Take 2,000 mLs by mouth as directed.  Please "follow instructions provided by the clinic. 2000 mL 0     No current facility-administered medications for this visit. The following items on the Medicare Health Risk Assessment were found to be positive            Vision and hearing screens: {HEARING KQWTJZ:3810315}    Advance Directive: The patient has the following documents:  Power of  for [address] [place]    Cognitive Assessment: {COGNITIVE SCREEN:3545893::""no evidence of cognitive dysfunction by direct observation\""}    Recent PHQ 2/9 Score    PHQ 2:  Date Adult PHQ 2 Score Adult PHQ 2 Interpretation   12/18/2020 2 No further screening needed       PHQ 9:  Date Adult PHQ 9 Score Adult PHQ 9 Interpretation   12/18/2020 11 Moderate Depression       DEPRESSION ASSESSMENT/PLAN:  {[facility] Depression Screening Follow-Up:910922}     Body mass index is 32.49 kg/mÂ². BMI ASSESSMENT/PLAN:  {[facility] BMI Follow-Up DQFE:357502}     {Needed Screening/Treatment (hit delete key if you choose not to use):1649794}  Needed follow up:  {FOLLOWUP SERVICES:9894685}    ------------------------------------------------------------------------    REVIEW OF SYSTEMS: The patient DENIES symptoms of:   General: Fever, chills, night sweats, fatigue, weight loss, weight gain, decreased appetite. Skin: Rash, itching, lumps or bumps or moles that are changing, hair changes, nail changes. Eyes: Visual blurring, double vision, spots before the eyes, eye pain. Ears: Decreased auditory acuity, ringing or tinnitus in the ears, pain in the ears, discharge from the ears. Nose: Nosebleed, discharge from the nose, decrease in ability to smell. Mouth: Soreness of the mouth or tongue or lips, abnormality of the teeth or gums. Throat: Sore throat, hoarseness or voice change. Neck: Stiffness or pain in the neck. Breasts: Changes of the breasts, lumps or bumps in the breasts, discharge from the nipples, pain in the breasts, nipple changes.   Cardiorespiratory: Chest pain, edema, " "cough, hemoptysis, wheeze, shortness of breath. Gastrointestinal: Abdominal pain, nausea, vomiting, constipation, diarrhea, black tarry stools, blood in the stools, change in the bowel habits, sour burps or heartburn, indigestion. Genitourinary: Urgency, frequency, dysuria, hematuria, nocturia, vaginal discharge, vaginal itching, abnormal vaginal bleeding or pain. Neurologic: Headache, weakness, loss of sensation, visual disturbance, trouble with balance or coordination, loss of memory. Musculoskeletal: Joint pain, muscle pain. Psychiatric: Symptoms of depression, feeling sad or blue. PHYSICAL EXAM:    Visit Vitals  /68   Pulse 76   Resp 14   Ht 5' 9"" (1.753 m)   Wt 99.8 kg   BMI 32.49 kg/mÂ²     Patient Reported Vitals     There is no flowsheet data to display. GENERAL APPEARANCE: Appears stated age, is in no apparent distress, is well developed and well nourished. SKIN: Normal color for ethnicity, normal texture, good turgor, no skin rashes, no atypical-appearing skin lesions, no bruises. LYMPH NODES: No cervical, supraclavicular, axillary or inguinal adenopathy. HEAD: Normocephalic. EYES: Pupils are equal and reactive to light and accommodation, extraocular movements are full, sclerae and conjunctivae are normal, lids and lashes are normal.  Visual acuity above. EARS: Pinnae and external ears are normal bilaterally, external auditory canals are normal, tympanic membranes are normal,  there is no tragal tenderness, auditory acuity is grossly intact. See above. NOSE: External nose is normal to inspection, no septal deviation. MOUTH/THROAT: Tongue is midline and appears normal, oropharynx appears normal, soft palate and uvula are normal, oral mucosa is normal.  Dentition ***. NECK: Neck is supple, no thyromegaly, trachea is midline. CHEST: Configuration normal, nontender to palpation, respiratory effort is not labored.   BREASTS: Breasts symmetric, skin shows no dimpling or induration, " no nipple changes, no nipple discharge, no palpable nodules, no breast discomfort. LUNGS: Lungs are clear to auscultation with normal inspiratory/expiratory sounds, no rales, no rhonchi, no wheezes. CARDIOVASCULAR: Normal point of maximal impulse, normal rate and rhythm, no palpable heaves or thrills, S1 and S2 normal, no S3 or S4, no murmurs, no extra heart sounds. No carotid or abdominal bruits. ABDOMEN: Abdomen is soft, normal active bowel sounds, nontender, without masses, without hepatomegaly or splenomegaly, no pulsatile masses. BACK: Inspection shows normal curvature, no discomfort to palpation of the midline, no costovertebral angle discomfort to palpation. GENITALIA: Bartholin's normal, [place]'s normal, external genitalia normal, cervix mobile and nontender, vaginal vault normal, uterus midline, normal shape and size, non-tender, adnexa nonpalpable and nontender. RECTAL: External anal opening appears normal, normal sphincter tone, no palpable internal anal or rectal masses, stool appears normal color and consistency. EXTREMITIES: No clubbing, no cyanosis, no edema, normal muscle tone and development bilaterally. NEUROLOGIC:  Alert, appropriate, oriented x 3. Speech fluent. Cranial nerves 2-12 intact, motor strength intact and symmetric, no apraxia or ataxia observed, deep tendon reflexes normal and symmetric, no tremor noted. PSYCHIATRIC:  Affect appropriate, insight fair, mood good. See orders. See Patient Instructions section. Return in about 1 year (around 3/12/2022) for Medicare Wellness Visit.

## 2021-03-23 ENCOUNTER — TELEPHONE (OUTPATIENT)
Dept: ADMINISTRATIVE | Age: 21
End: 2021-03-23

## 2021-03-23 NOTE — TELEPHONE ENCOUNTER
Pt req appt this week for migraines and birth control, could not accommodate schedule, please call if able to schedule this week.

## 2021-03-24 ENCOUNTER — VIRTUAL VISIT (OUTPATIENT)
Dept: FAMILY MEDICINE CLINIC | Age: 21
End: 2021-03-24
Payer: COMMERCIAL

## 2021-03-24 DIAGNOSIS — N92.6 IRREGULAR MENSTRUAL CYCLE: ICD-10-CM

## 2021-03-24 DIAGNOSIS — G43.101 COMPLICATED MIGRAINE WITH STATUS MIGRAINOSUS: ICD-10-CM

## 2021-03-24 DIAGNOSIS — L70.0 ACNE VULGARIS: Primary | ICD-10-CM

## 2021-03-24 PROCEDURE — 99214 OFFICE O/P EST MOD 30 MIN: CPT | Performed by: CLINICAL NURSE SPECIALIST

## 2021-03-24 RX ORDER — SPIRONOLACTONE 50 MG/1
50 TABLET, FILM COATED ORAL DAILY
Qty: 90 TABLET | Refills: 1 | Status: SHIPPED | OUTPATIENT
Start: 2021-03-24 | End: 2022-01-18

## 2021-03-24 RX ORDER — SUMATRIPTAN 50 MG/1
TABLET, FILM COATED ORAL
Qty: 9 TABLET | Refills: 5 | Status: SHIPPED | OUTPATIENT
Start: 2021-03-24 | End: 2022-01-09 | Stop reason: SDUPTHER

## 2021-03-24 ASSESSMENT — ENCOUNTER SYMPTOMS
SHORTNESS OF BREATH: 0
DIARRHEA: 0
TROUBLE SWALLOWING: 0
FACIAL SWELLING: 0
BACK PAIN: 0
CONSTIPATION: 0
COLOR CHANGE: 0
SINUS PRESSURE: 0
EYE REDNESS: 0
WHEEZING: 0
COUGH: 0
ABDOMINAL PAIN: 0
EYE PAIN: 0
SORE THROAT: 0
EYE DISCHARGE: 0
CHEST TIGHTNESS: 0

## 2021-03-24 ASSESSMENT — PATIENT HEALTH QUESTIONNAIRE - PHQ9
SUM OF ALL RESPONSES TO PHQ9 QUESTIONS 1 & 2: 0
SUM OF ALL RESPONSES TO PHQ QUESTIONS 1-9: 0
SUM OF ALL RESPONSES TO PHQ QUESTIONS 1-9: 0
2. FEELING DOWN, DEPRESSED OR HOPELESS: 0
SUM OF ALL RESPONSES TO PHQ QUESTIONS 1-9: 0

## 2021-03-24 NOTE — PROGRESS NOTES
SUBJECTIVE:  Gael Montaño is a 24 y.o. who presents today for Headache      HPI    VIRTUAL VISIT VIA TELEPHONE    _______________________________________________________________    Due to COVID 23 outbreak, patient's office visit was converted to telephone virtual visit. Patient was contacted and agreed to proceed with a telephone virtual visit. The risks and benefits of converting to a telephone virtual visit were discussed in light of the current infectious disease epidemic. Patient also understood that insurance coverage and co-pays are up to their individual insurance plans. Kaity Thomas was evaluated today via doxy. me VV for acute visit. She has been dealing with increased acne to her face around her skin and cheek area since about December. She thought her OCP was changed then, but it was actually changed last summer based on chart review. Her period is regular and tolerable. She has been using topical regimens without any improvement in acne. She was on irish previously, was told of elevated testosterone level. She has had 2 headache episodes lately that were different than a typical headache for her. First on 2/9 she had visual aura for one hour but never with headache pain. The 2nd was 2 days ago. She had bilateral visual aura again followed by bilateral frontal headache. She took 2 excedrin and then went to sleep. When she woke up her headache and vision were improved she felt a tingling sensation to her hands and mouth. She had some brain fog and fatigue for several hours following. S/p sinus surgery Dr Maria Isabel Mckeon. Doing well.      Past Medical History:   Diagnosis Date    Asthma     Generalized anxiety disorder     Insomnia      Past Surgical History:   Procedure Laterality Date    SINUS SURGERY       Family History   Problem Relation Age of Onset    Diabetes Father     Heart Failure Maternal Grandmother     Heart Failure Maternal Grandfather     Heart Failure Paternal Grandmother     Stroke Paternal Grandmother     Diabetes Paternal Grandmother     Heart Failure Paternal Grandfather      Social History     Tobacco Use    Smoking status: Never Smoker    Smokeless tobacco: Never Used   Substance Use Topics    Alcohol use: No     Current Outpatient Medications   Medication Sig Dispense Refill    norethindrone-ethinyl estradiol-Fe (LO LOESTRIN FE) 1 MG-10 MCG / 10 MCG tablet Take 1 tablet by mouth daily 84 tablet 3    spironolactone (ALDACTONE) 50 MG tablet Take 1 tablet by mouth daily 90 tablet 1    SUMAtriptan (IMITREX) 50 MG tablet Take 1 tablet at onset of headache, may repeat in 1 hour. Max 200mg per day. 9 tablet 5    albuterol sulfate HFA (VENTOLIN HFA) 108 (90 Base) MCG/ACT inhaler Inhale 2 puffs into the lungs 4 times daily as needed for Wheezing 1 Inhaler 5    hydrOXYzine (VISTARIL) 25 MG capsule Take 1 capsule by mouth 3 times daily as needed for Anxiety 60 capsule 0    cetirizine (ZYRTEC) 10 MG tablet Take 10 mg by mouth as needed for Allergies       No current facility-administered medications for this visit. No Known Allergies    Review of Systems   Constitutional: Negative for appetite change, chills, diaphoresis, fatigue and fever. HENT: Negative for congestion, ear pain, facial swelling, hearing loss, postnasal drip, sinus pressure, sore throat and trouble swallowing. Eyes: Positive for visual disturbance. Negative for pain, discharge and redness. Respiratory: Negative for cough, chest tightness, shortness of breath and wheezing. Cardiovascular: Negative for chest pain, palpitations and leg swelling. Gastrointestinal: Negative for abdominal pain, constipation and diarrhea. Genitourinary: Negative for difficulty urinating, dysuria, flank pain, frequency, hematuria, menstrual problem and urgency. Musculoskeletal: Negative for arthralgias, back pain, joint swelling and neck pain. Skin: Positive for wound (acne).  Negative for color change and rash. Neurological: Positive for headaches. Negative for dizziness, syncope, weakness and light-headedness. Hematological: Negative for adenopathy. Does not bruise/bleed easily. Psychiatric/Behavioral: Negative for confusion, dysphoric mood, sleep disturbance and suicidal ideas. The patient is not nervous/anxious. OBJECTIVE:  There were no vitals taken for this visit. Physical Exam  Constitutional:       General: She is not in acute distress. Appearance: She is not ill-appearing or toxic-appearing. HENT:      Head: Normocephalic. Nose: No congestion. Eyes:      Conjunctiva/sclera: Conjunctivae normal.   Neck:      Musculoskeletal: Normal range of motion. Pulmonary:      Effort: Pulmonary effort is normal. No respiratory distress. Breath sounds: No wheezing. Skin:     Findings: Acne present. Neurological:      General: No focal deficit present. Mental Status: She is alert and oriented to person, place, and time. Mental status is at baseline. Psychiatric:         Mood and Affect: Mood normal.         Behavior: Behavior normal.         Thought Content: Thought content normal.         Judgment: Judgment normal.         ASSESSMENT/PLAN:  1. Acne vulgaris  Worsening  Failed topical  Add aldactone  If unimproved or does not tolerate aldactone, will adjust OCP  - spironolactone (ALDACTONE) 50 MG tablet; Take 1 tablet by mouth daily  Dispense: 90 tablet; Refill: 1    2. Irregular menstrual cycle  stable  - norethindrone-ethinyl estradiol-Fe (LO LOESTRIN FE) 1 MG-10 MCG / 10 MCG tablet; Take 1 tablet by mouth daily  Dispense: 84 tablet; Refill: 3    3. Complicated migraine with status migrainosus  New  Discussed prevention measures like rest, hydration, limiting caffeine, etc  Add imitrex prn, discussed use and side effects  If continued or worsening, needs work up  - SUMAtriptan (IMITREX) 50 MG tablet; Take 1 tablet at onset of headache, may repeat in 1 hour.   Max

## 2021-03-25 ENCOUNTER — TELEPHONE (OUTPATIENT)
Dept: FAMILY MEDICINE CLINIC | Age: 21
End: 2021-03-25

## 2021-03-25 NOTE — TELEPHONE ENCOUNTER
Patient is requesting a school excuse for this past Monday when the patient had to miss class due to a migraine. Please advise.

## 2021-08-12 ENCOUNTER — OFFICE VISIT (OUTPATIENT)
Dept: OTOLARYNGOLOGY | Facility: CLINIC | Age: 21
End: 2021-08-12

## 2021-08-12 VITALS
DIASTOLIC BLOOD PRESSURE: 79 MMHG | WEIGHT: 191 LBS | HEART RATE: 87 BPM | TEMPERATURE: 98 F | HEIGHT: 69 IN | BODY MASS INDEX: 28.29 KG/M2 | SYSTOLIC BLOOD PRESSURE: 110 MMHG

## 2021-08-12 DIAGNOSIS — Z98.890 S/P SINUS SURGERY: ICD-10-CM

## 2021-08-12 DIAGNOSIS — J30.9 CHRONIC ALLERGIC RHINITIS: Primary | ICD-10-CM

## 2021-08-12 PROCEDURE — 99214 OFFICE O/P EST MOD 30 MIN: CPT | Performed by: NURSE PRACTITIONER

## 2021-08-12 RX ORDER — FLUTICASONE PROPIONATE 50 MCG
2 SPRAY, SUSPENSION (ML) NASAL DAILY
Qty: 16 G | Refills: 11 | Status: SHIPPED | OUTPATIENT
Start: 2021-08-12 | End: 2022-08-17

## 2021-08-12 RX ORDER — FLUOXETINE HYDROCHLORIDE 20 MG/1
20 CAPSULE ORAL DAILY
COMMUNITY
Start: 2021-06-09 | End: 2022-02-16

## 2021-08-22 NOTE — PROGRESS NOTES
PRIMARY CARE PROVIDER: Keisha Cope APRN  REFERRING PROVIDER: No ref. provider found    Chief Complaint   Patient presents with   • Allergies       Subjective   History of Present Illness:  Caprice Thibodeaux is a  21 y.o. female  who presents for follow up s/p bilateral functional endoscopic anterior ethmoidectomy with bilateral middle meatal antrostomies, septoplasty, bilateral bib bullosa resection, bilateral inferior turbinate reduction, and resection of right nasal polyposis by Dr. Snyder on 12/30/2020. The patient has had a relatively normal postoperative course. She reports continued nasal congestion. This seems to be worse at night. She has been using Astelin. She denies any other related complaints.      Past History:  Past Medical History:   Diagnosis Date   • Anxiety    • Asthma    • Enlarged thyroid    • Sinusitis      Past Surgical History:   Procedure Laterality Date   • ENDOSCOPIC FUNCTIONAL SINUS SURGERY (FESS) Bilateral 12/30/2020    Procedure: Bilateral functional endoscopic anterior ethmoidectomy with bilateral middle meatal antrostomies Septoplasty Bilateral bib bullosa resection Bilateral inferior turbinate reduction via Coblation Resection of right nasal polyposis;  Surgeon: David Snyder MD;  Location: [facility];  Service: ENT;  Laterality: Bilateral;   • WISDOM TOOTH EXTRACTION       Family History   Problem Relation Age of Onset   • Hyperlipidemia Mother    • Diabetes Father    • Hypertension Father      Social History     Tobacco Use   • Smoking status: Never Smoker   • Smokeless tobacco: Never Used   Vaping Use   • Vaping Use: Never used   Substance Use Topics   • Alcohol use: Not Currently     Comment: mainly on weekends   • Drug use: Never     Allergies:  Patient has no known allergies.    Current Outpatient Medications:   •  FLUoxetine (PROzac) 20 MG capsule, Take 20 mg by mouth Daily., Disp: , Rfl:   •  LO LOESTRIN FE 1 MG-10 MCG / 10 MCG tablet, Take 1 tablet by mouth  "Daily., Disp: , Rfl:   •  olopatadine (PATANASE) 0.6 % solution nasal solution, 2 sprays by Each Nare route 2 (Two) Times a Day., Disp: 1 bottle, Rfl: 11  •  albuterol sulfate  (90 Base) MCG/ACT inhaler, Inhale 2 puffs Every 6 (Six) Hours As Needed for Wheezing., Disp: , Rfl:   •  fluticasone (FLONASE) 50 MCG/ACT nasal spray, 2 sprays into the nostril(s) as directed by provider Daily for 30 days., Disp: 16 g, Rfl: 11  •  montelukast (SINGULAIR) 10 MG tablet, Take 1 tablet by mouth Daily., Disp: 30 tablet, Rfl: 11  •  mupirocin (BACTROBAN) 2 % ointment, , Disp: , Rfl:       Objective     Vital Signs:    /79   Pulse 87   Temp 98 °F (36.7 °C) (Temporal)   Ht 175 cm (68.9\")   Wt 86.6 kg (191 lb)   BMI 28.29 kg/m²     Physical Exam:  Physical Exam  Vitals reviewed.   Constitutional:       General: She is not in acute distress.     Appearance: She is well-developed.   HENT:      Head: Normocephalic and atraumatic.      Right Ear: External ear normal.      Left Ear: External ear normal.      Nose: Mucosal edema, congestion and rhinorrhea present. No septal deviation.      Mouth/Throat:      Lips: Pink.   Eyes:      General: Lids are normal.   Pulmonary:      Effort: Pulmonary effort is normal. No respiratory distress.      Breath sounds: No stridor.   Musculoskeletal:      Cervical back: Neck supple.   Neurological:      Mental Status: She is alert and oriented to person, place, and time.   Psychiatric:         Mood and Affect: Mood normal.         Speech: Speech normal.         Behavior: Behavior normal. Behavior is cooperative.         Assessment   Assessment:  1. Chronic allergic rhinitis    2. S/P sinus surgery        Plan   Plan:    Continue Astelin. Start Flonase.  She was instructed to call return for any problems or worsening symptoms.  Follow-up in 6 months.    New Medications Ordered This Visit   Medications   • fluticasone (FLONASE) 50 MCG/ACT nasal spray     Si sprays into the nostril(s) " as directed by provider Daily for 30 days.     Dispense:  16 g     Refill:  11     There are no Patient Instructions on file for this visit.  Return in about 6 months (around 2/12/2022), or if symptoms worsen or fail to improve, for Recheck.    My findings and recommendations were discussed and questions were answered.     Joleen Dejesus, APRN

## 2021-12-07 ENCOUNTER — TELEPHONE (OUTPATIENT)
Dept: FAMILY MEDICINE CLINIC | Facility: CLINIC | Age: 21
End: 2021-12-07

## 2021-12-07 ENCOUNTER — LAB (OUTPATIENT)
Dept: LAB | Facility: HOSPITAL | Age: 21
End: 2021-12-07

## 2021-12-07 ENCOUNTER — TELEMEDICINE (OUTPATIENT)
Dept: FAMILY MEDICINE CLINIC | Facility: CLINIC | Age: 21
End: 2021-12-07

## 2021-12-07 VITALS — BODY MASS INDEX: 28.64 KG/M2 | HEIGHT: 68 IN | WEIGHT: 189 LBS

## 2021-12-07 DIAGNOSIS — R05.9 COUGH: ICD-10-CM

## 2021-12-07 DIAGNOSIS — R68.83 CHILLS: ICD-10-CM

## 2021-12-07 DIAGNOSIS — J06.9 UPPER RESPIRATORY TRACT INFECTION, UNSPECIFIED TYPE: Primary | ICD-10-CM

## 2021-12-07 DIAGNOSIS — Z20.822 SUSPECTED COVID-19 VIRUS INFECTION: ICD-10-CM

## 2021-12-07 DIAGNOSIS — J02.9 SORE THROAT: ICD-10-CM

## 2021-12-07 DIAGNOSIS — E66.3 OVERWEIGHT (BMI 25.0-29.9): ICD-10-CM

## 2021-12-07 LAB — SARS-COV-2 RNA PNL SPEC NAA+PROBE: NOT DETECTED

## 2021-12-07 PROCEDURE — 99213 OFFICE O/P EST LOW 20 MIN: CPT | Performed by: NURSE PRACTITIONER

## 2021-12-07 PROCEDURE — 87635 SARS-COV-2 COVID-19 AMP PRB: CPT

## 2021-12-07 RX ORDER — METHYLPREDNISOLONE 4 MG/1
TABLET ORAL
Qty: 1 EACH | Refills: 0 | Status: SHIPPED | OUTPATIENT
Start: 2021-12-07 | End: 2021-12-14

## 2021-12-07 RX ORDER — AZITHROMYCIN 250 MG/1
TABLET, FILM COATED ORAL
Qty: 6 TABLET | Refills: 0 | Status: SHIPPED | OUTPATIENT
Start: 2021-12-07 | End: 2021-12-14

## 2021-12-07 NOTE — PROGRESS NOTES
"This was an audio and video enabled telemedicine encounter. Patient verbally consented to visit. Patient was located at Vehicle and I was located at [facility]  location.     Chief Complaint  Cough and Sore Throat    Subjective    History of Present Illness      Patient presents to [facility] for   Patient complains of coughing, sore throat, drainage, and chills.       Review of Systems   Constitutional: Positive for chills.   HENT: Positive for postnasal drip and sore throat.    Eyes: Negative.    Respiratory: Positive for cough.    Cardiovascular: Negative.    Gastrointestinal: Negative.    Endocrine: Negative.    Genitourinary: Negative.    Musculoskeletal: Negative.    Skin: Negative.    Allergic/Immunologic: Negative.    Neurological: Negative.    Hematological: Negative.    Psychiatric/Behavioral: Negative.        I have reviewed and agree with the HPI and [facility] information as above.  Karla Raines, APRN     Objective   Vital Signs:   Ht 172.7 cm (68\")   Wt 85.7 kg (189 lb)   BMI 28.74 kg/m²       Physical Exam  Vitals and nursing note reviewed.   Constitutional:       Appearance: Normal appearance. She is well-developed.      Comments: overweight   HENT:      Head: Normocephalic and atraumatic.      Mouth/Throat:      Lips: Pink. No lesions.   Eyes:      General: Lids are normal. Vision grossly intact.      Conjunctiva/sclera: Conjunctivae normal.      Right eye: Right conjunctiva is not injected.      Left eye: Left conjunctiva is not injected.   Pulmonary:      Effort: Pulmonary effort is normal.   Musculoskeletal:         General: Normal range of motion.      Cervical back: Full passive range of motion without pain, normal range of motion and neck supple.   Skin:     General: Skin is dry.   Neurological:      Mental Status: She is alert and oriented to person, place, and time.      Motor: Motor function is intact.   Psychiatric:         Mood and Affect: Mood and " affect normal.         Judgment: Judgment normal.          Result Review  Data Reviewed:   The following data was reviewed by: MARTITA Noriega on 12/07/2021:  COVID PRE-OP / PRE-PROCEDURE SCREENING ORDER (NO ISOLATION) - Swab, Nasal Cavity (12/07/2021 15:49)             Assessment and Plan      Problem List Items Addressed This Visit     None      Visit Diagnoses     Upper respiratory tract infection, unspecified type    -  Primary    Relevant Medications    azithromycin (Zithromax Z-Charly) 250 MG tablet    methylPREDNISolone (MEDROL) 4 MG dose pack    Suspected COVID-19 virus infection        Relevant Orders    COVID PRE-OP / PRE-PROCEDURE SCREENING ORDER (NO ISOLATION) - Swab, Nasal Cavity (Completed)    Cough        Overweight (BMI 25.0-29.9)        Sore throat        Chills            Patient being seen through telehealth today with complaints of cough, sore throat, sinus drainage, and chills.  She denies any known fever.  She feels that her congestion is more in her chest than in her sinuses. Voice is hoarse. She has no known exposure to Covid, but has been around several people at work sick and did not get tested.  She has been vaccinated.  She has never had Covid in the past.    1.  Will Covid swab at this time.  If negative will start an antibiotic and steroid.    --Covid is negative.  Will send a Z-Charly and Medrol Dosepak to the pharmacy for patient to start.  Follow-up if symptoms do not improve or become worse.  Nurses to call with Covid results.    Follow Up   Return if symptoms worsen or fail to improve.  Patient was given instructions and counseling regarding her condition or for health maintenance advice. Please see specific information pulled into the AVS if appropriate.

## 2021-12-14 ENCOUNTER — TELEPHONE (OUTPATIENT)
Dept: FAMILY MEDICINE CLINIC | Age: 21
End: 2021-12-14

## 2021-12-14 ENCOUNTER — HOSPITAL ENCOUNTER (OUTPATIENT)
Dept: GENERAL RADIOLOGY | Facility: HOSPITAL | Age: 21
Discharge: HOME | End: 2021-12-14
Admitting: NURSE PRACTITIONER

## 2021-12-14 ENCOUNTER — NURSE TRIAGE (OUTPATIENT)
Dept: OTHER | Facility: CLINIC | Age: 21
End: 2021-12-14

## 2021-12-14 PROCEDURE — 71046 X-RAY EXAM CHEST 2 VIEWS: CPT

## 2021-12-14 NOTE — TELEPHONE ENCOUNTER
Received call from [facility] with [employer]. Brief description of triage: cough    Triage indicates for patient to be seen today by pcp or ucc if appt not available. Care advice provided, patient verbalizes understanding; denies any other questions or concerns; instructed to call back for any new or worsening symptoms. Writer provided warm transfer to Stiven Taylor at [facility] for appointment scheduling. Attention Provider: Thank you for allowing me to participate in the care of your patient. The patient was connected to triage in response to information provided to the ECC/PSC. Please do not respond through this encounter as the response is not directed to a shared pool. Reason for Disposition   SEVERE coughing spells (e.g., whooping sound after coughing, vomiting after coughing)    Answer Assessment - Initial Assessment Questions  1. ONSET: \"When did the cough begin? \"      1 week    2. SEVERITY: \"How bad is the cough today? \"      Severe    3. RESPIRATORY DISTRESS: \"Describe your breathing. \"   Hurts to take deep breath    4. FEVER: \"Do you have a fever? \" If so, ask: \"What is your temperature, how was it measured, and when did it start? \"   none    5. HEMOPTYSIS: \"Are you coughing up any blood? \" If so ask: \"How much? \" (flecks, streaks, tablespoons, etc.)     No    6. TREATMENT: \"What have you done so far to treat the cough? \" (e.g., meds, fluids, humidifier)      Medrol dose pack    7. CARDIAC HISTORY: \"Do you have any history of heart disease? \" (e.g., heart attack, congestive heart failure)     No    8. LUNG HISTORY: \"Do you have any history of lung disease? \"  (e.g., pulmonary embolus, asthma, emphysema)  Childhood asthma    9. PE RISK FACTORS: \"Do you have a history of blood clots? \" (or: recent major surgery, recent prolonged travel, bedridden)      No    10.  OTHER SYMPTOMS: \"Do you have any other symptoms? (e.g., runny nose, wheezing, chest pain)      Chest and back pain    11. PREGNANCY: \"Is there any chance you are pregnant? \" \"When was your last menstrual period? \"      No, last week    12. TRAVEL: \"Have you traveled out of the country in the last month? \" (e.g., travel history, exposures)    no    Protocols used: COUGH-ADULT-[place]

## 2021-12-14 NOTE — TELEPHONE ENCOUNTER
----- Message from Precious Grady sent at 12/14/2021  7:35 AM CST -----  Subject: Appointment Request    Reason for Call: Immediate Return from RN Triage    QUESTIONS  Type of Appointment? Established Patient  Reason for appointment request? No appointments available during search  Additional Information for Provider? pt had a cough and sore throat, sinus   pressure last week, pt finished z-pack but can not get rid of the chest   congestion, pts back and chest is hurting from coughing so much. pt   screened red but does not want to do a virtual visit, she wants to be seen   in person. Pt did speak with our nurse Triage and was told to be seen   today.   ---------------------------------------------------------------------------  --------------  CALL BACK INFO  What is the best way for the office to contact you? OK to leave message on   voicemail  Preferred Call Back Phone Number? [phone]  ---------------------------------------------------------------------------  --------------  SCRIPT ANSWERS  Patient needs to be seen today? Yes   Nurse Name? Maninder Rutherford  Have you been diagnosed with, awaiting test results for, or told that you   are suspected of having COVID-19 (Coronavirus)? (If patient has tested   negative or was tested as a requirement for work, school, or travel and   not based on symptoms, answer no)? No  Within the past two weeks have you developed any of the following symptoms   (answer no if symptoms have been present longer than 2 weeks or began   more than 2 weeks ago)? Fever or Chills, Cough, Shortness of breath or   difficulty breathing, Loss of taste or smell, Sore throat, Nasal   congestion, Sneezing or runny nose, Fatigue or generalized body aches   (answer no if pain is specific to a body part e.g. back pain), Diarrhea,   Headache?  Yes

## 2022-01-09 DIAGNOSIS — G43.101 COMPLICATED MIGRAINE WITH STATUS MIGRAINOSUS: ICD-10-CM

## 2022-01-09 DIAGNOSIS — N92.6 IRREGULAR MENSTRUAL CYCLE: ICD-10-CM

## 2022-01-10 ENCOUNTER — TELEPHONE (OUTPATIENT)
Dept: FAMILY MEDICINE CLINIC | Age: 22
End: 2022-01-10

## 2022-01-10 DIAGNOSIS — N92.6 IRREGULAR MENSTRUAL CYCLE: Primary | ICD-10-CM

## 2022-01-10 RX ORDER — SUMATRIPTAN 50 MG/1
TABLET, FILM COATED ORAL
Qty: 9 TABLET | Refills: 0 | Status: SHIPPED | OUTPATIENT
Start: 2022-01-10

## 2022-01-10 RX ORDER — NORGESTIMATE AND ETHINYL ESTRADIOL 7DAYSX3 LO
1 KIT ORAL DAILY
Qty: 28 TABLET | Refills: 3 | Status: SHIPPED | OUTPATIENT
Start: 2022-01-10 | End: 2022-01-18

## 2022-01-10 NOTE — TELEPHONE ENCOUNTER
Cyndie Bella called to request a refill on her medication. Last office visit : 3/24/2021   Next office visit : Visit date not found     Requested Prescriptions     Pending Prescriptions Disp Refills    norethindrone-ethinyl estradiol-Fe (LO LOESTRIN FE) 1 MG-10 MCG / 10 MCG tablet 84 tablet 0     Sig: Take 1 tablet by mouth daily    SUMAtriptan (IMITREX) 50 MG tablet 9 tablet 0     Sig: Take 1 tablet at onset of headache, may repeat in 1 hour. Max 200mg per day.             Geri Dial MA

## 2022-01-18 ENCOUNTER — TELEMEDICINE (OUTPATIENT)
Dept: FAMILY MEDICINE CLINIC | Age: 22
End: 2022-01-18
Payer: COMMERCIAL

## 2022-01-18 DIAGNOSIS — F41.1 GENERALIZED ANXIETY DISORDER: ICD-10-CM

## 2022-01-18 DIAGNOSIS — G43.101 COMPLICATED MIGRAINE WITH STATUS MIGRAINOSUS: Primary | ICD-10-CM

## 2022-01-18 DIAGNOSIS — N92.6 IRREGULAR MENSTRUAL CYCLE: ICD-10-CM

## 2022-01-18 PROBLEM — J06.9 BACTERIAL UPPER RESPIRATORY INFECTION: Status: RESOLVED | Noted: 2019-12-20 | Resolved: 2022-01-18

## 2022-01-18 PROBLEM — B96.89 BACTERIAL UPPER RESPIRATORY INFECTION: Status: RESOLVED | Noted: 2019-12-20 | Resolved: 2022-01-18

## 2022-01-18 PROCEDURE — 99214 OFFICE O/P EST MOD 30 MIN: CPT | Performed by: CLINICAL NURSE SPECIALIST

## 2022-01-18 RX ORDER — FLUOXETINE HYDROCHLORIDE 40 MG/1
40 CAPSULE ORAL DAILY
COMMUNITY
End: 2022-03-15

## 2022-01-18 ASSESSMENT — ENCOUNTER SYMPTOMS
WHEEZING: 0
TROUBLE SWALLOWING: 0
EYE DISCHARGE: 0
CHEST TIGHTNESS: 0
COLOR CHANGE: 0
SORE THROAT: 0
ABDOMINAL PAIN: 0
DIARRHEA: 0
BACK PAIN: 0
CONSTIPATION: 0
COUGH: 0
SHORTNESS OF BREATH: 0
FACIAL SWELLING: 0
EYE PAIN: 0
SINUS PRESSURE: 0
EYE REDNESS: 0

## 2022-01-18 NOTE — PROGRESS NOTES
SUBJECTIVE:  Pierre Hill is a 20 y.o. who presents today for Contraception      HPI    VIRTUAL VISIT VIA TELEPHONE    _______________________________________________________________    Due to COVID 23 outbreak, patient's office visit was converted to telephone virtual visit. Patient was contacted and agreed to proceed with a telephone virtual visit. The risks and benefits of converting to a telephone virtual visit were discussed in light of the current infectious disease epidemic. Patient also understood that insurance coverage and co-pays are up to their individual insurance plans. [facility]. Headaches, stable. She did well from last May-January without any migraines. She has had one this month. She took imitrex which was helpful at lessening the severity of pain. She tolerates imitrex well without side effects. Eye exam is UTD. Irregular menses, stable on loestrin. There was concern about insurance no longer paying for this therefore I sent in ortho tri cyclen lo but she states she was able to get the loestrin. No side effects noted to this med regimen. Pap possibly due through GYN.     KERI, stable on prozac. Recently went up from 20mg to 40mg without issue. Was seeing psych in [facility], but now residing in [address] [place] [address]. Vistaril ineffective prn.        Past Medical History:   Diagnosis Date    Asthma     Generalized anxiety disorder     Insomnia      Past Surgical History:   Procedure Laterality Date    SINUS SURGERY       Family History   Problem Relation Age of Onset    Diabetes Father     Heart Failure Maternal Grandmother     Heart Failure Maternal Grandfather     Heart Failure Paternal Grandmother     Stroke Paternal Grandmother     Diabetes Paternal Grandmother     Heart Failure Paternal Grandfather      Social History     Tobacco Use    Smoking status: Never Smoker    Smokeless tobacco: Never Used   Substance Use Topics  Alcohol use: No     Current Outpatient Medications   Medication Sig Dispense Refill    FLUoxetine (PROZAC) 40 MG capsule Take 40 mg by mouth daily      norethindrone-ethinyl estradiol-Fe (LO LOESTRIN FE) 1 MG-10 MCG / 10 MCG tablet Take 1 tablet by mouth daily 84 tablet 0    SUMAtriptan (IMITREX) 50 MG tablet Take 1 tablet at onset of headache, may repeat in 1 hour. Max 200mg per day. 9 tablet 0    albuterol sulfate HFA (VENTOLIN HFA) 108 (90 Base) MCG/ACT inhaler Inhale 2 puffs into the lungs 4 times daily as needed for Wheezing 1 Inhaler 5     No current facility-administered medications for this visit. No Known Allergies    Review of Systems   Constitutional: Negative for appetite change, chills, diaphoresis, fatigue and fever. HENT: Negative for congestion, ear pain, facial swelling, hearing loss, postnasal drip, sinus pressure, sore throat and trouble swallowing. Eyes: Positive for visual disturbance. Negative for pain, discharge and redness. Respiratory: Negative for cough, chest tightness, shortness of breath and wheezing. Cardiovascular: Negative for chest pain and palpitations. Gastrointestinal: Negative for abdominal pain, constipation and diarrhea. Genitourinary: Positive for menstrual problem. Negative for difficulty urinating, dysuria, flank pain, frequency, hematuria and urgency. Musculoskeletal: Negative for arthralgias, back pain, joint swelling and neck pain. Skin: Negative for color change and rash. Neurological: Positive for headaches. Negative for dizziness, syncope, weakness and light-headedness. Hematological: Negative for adenopathy. Does not bruise/bleed easily. Psychiatric/Behavioral: Negative for confusion and dysphoric mood. The patient is nervous/anxious. OBJECTIVE:  There were no vitals taken for this visit. Physical Exam  Constitutional:       General: She is not in acute distress.      Appearance: She is not ill-appearing or toxic-appearing. HENT:      Head: Normocephalic. Neurological:      Mental Status: She is alert and oriented to person, place, and time. Mental status is at baseline. Psychiatric:         Mood and Affect: Mood normal.         Behavior: Behavior normal.         Thought Content: Thought content normal.         Judgment: Judgment normal.         ASSESSMENT/PLAN:  1. Complicated migraine with status migrainosus  Stable  Continue imitrex prn, may take 100mg x1 and repeat if needed  If worsening, to let me know for other options  Avoid triggers    2. Irregular menstrual cycle  Controlled, continue same    3. Generalized anxiety disorder  Stable, continue prozac  I can refill if she needs me to.            Return in about 1 year (around 1/18/2023) for physical.

## 2022-02-09 DIAGNOSIS — J30.9 CHRONIC ALLERGIC RHINITIS: Primary | ICD-10-CM

## 2022-02-09 DIAGNOSIS — Z98.890 S/P SINUS SURGERY: ICD-10-CM

## 2022-02-12 ENCOUNTER — LAB (OUTPATIENT)
Dept: LAB | Facility: HOSPITAL | Age: 22
End: 2022-02-12

## 2022-02-12 DIAGNOSIS — Z98.890 S/P SINUS SURGERY: ICD-10-CM

## 2022-02-12 DIAGNOSIS — J30.9 CHRONIC ALLERGIC RHINITIS: ICD-10-CM

## 2022-02-12 LAB — SARS-COV-2 ORF1AB RESP QL NAA+PROBE: NOT DETECTED

## 2022-02-12 PROCEDURE — U0004 COV-19 TEST NON-CDC HGH THRU: HCPCS

## 2022-02-12 PROCEDURE — C9803 HOPD COVID-19 SPEC COLLECT: HCPCS

## 2022-02-16 ENCOUNTER — OFFICE VISIT (OUTPATIENT)
Dept: OTOLARYNGOLOGY | Facility: CLINIC | Age: 22
End: 2022-02-16

## 2022-02-16 VITALS
DIASTOLIC BLOOD PRESSURE: 71 MMHG | HEIGHT: 69 IN | HEART RATE: 72 BPM | TEMPERATURE: 97.8 F | WEIGHT: 200.2 LBS | BODY MASS INDEX: 29.65 KG/M2 | SYSTOLIC BLOOD PRESSURE: 118 MMHG

## 2022-02-16 DIAGNOSIS — J32.4 CHRONIC PANSINUSITIS: ICD-10-CM

## 2022-02-16 DIAGNOSIS — J32.9 CHRONIC SINUSITIS, UNSPECIFIED LOCATION: ICD-10-CM

## 2022-02-16 DIAGNOSIS — J30.9 CHRONIC ALLERGIC RHINITIS: ICD-10-CM

## 2022-02-16 DIAGNOSIS — J30.9 ALLERGIC RHINITIS, UNSPECIFIED SEASONALITY, UNSPECIFIED TRIGGER: ICD-10-CM

## 2022-02-16 DIAGNOSIS — J34.2 NASAL SEPTAL DEVIATION: ICD-10-CM

## 2022-02-16 DIAGNOSIS — J30.1 SEASONAL ALLERGIC RHINITIS DUE TO POLLEN: ICD-10-CM

## 2022-02-16 DIAGNOSIS — Z98.890 S/P SINUS SURGERY: Primary | ICD-10-CM

## 2022-02-16 DIAGNOSIS — J34.3 HYPERTROPHY OF BOTH INFERIOR NASAL TURBINATES: ICD-10-CM

## 2022-02-16 DIAGNOSIS — J34.89 CONCHA BULLOSA: ICD-10-CM

## 2022-02-16 PROCEDURE — 99213 OFFICE O/P EST LOW 20 MIN: CPT | Performed by: NURSE PRACTITIONER

## 2022-02-16 RX ORDER — BUSPIRONE HYDROCHLORIDE 5 MG/1
TABLET ORAL
COMMUNITY
Start: 2022-02-11

## 2022-02-16 RX ORDER — AZELASTINE 1 MG/ML
2 SPRAY, METERED NASAL 2 TIMES DAILY
Qty: 30 ML | Refills: 11 | Status: SHIPPED | OUTPATIENT
Start: 2022-02-16

## 2022-02-16 RX ORDER — FLUOXETINE HYDROCHLORIDE 40 MG/1
CAPSULE ORAL
COMMUNITY
Start: 2022-02-11

## 2022-02-16 NOTE — PROGRESS NOTES
YOB: 2000  Location: [facility]  Location Address: [address] [place] [address], [facility], [address] [place] [address]  Location Phone: [phone]    Chief Complaint   Patient presents with   • Follow-up       History of Present Illness  Caprice Thibodeaux is a 22 y.o. female.  Caprice Thibodeaux who presents for follow up s/p bilateral functional endoscopic anterior ethmoidectomy with bilateral middle meatal antrostomies, septoplasty, bilateral bib bullosa resection, bilateral inferior turbinate reduction, and resection of right nasal polyposis by Dr. Snyder on 2020. The patient has had a relatively normal postoperative course. She continues to have occasional nasal congestion, this is worse when she lays down at night    She is only using flonase at this time        Past Medical History:   Diagnosis Date   • Anxiety    • Asthma    • Enlarged thyroid    • Sinusitis        Past Surgical History:   Procedure Laterality Date   • ENDOSCOPIC FUNCTIONAL SINUS SURGERY (FESS) Bilateral 2020    Procedure: Bilateral functional endoscopic anterior ethmoidectomy with bilateral middle meatal antrostomies Septoplasty Bilateral bib bullosa resection Bilateral inferior turbinate reduction via Coblation Resection of right nasal polyposis;  Surgeon: David Snyder MD;  Location: [facility];  Service: ENT;  Laterality: Bilateral;   • WISDOM TOOTH EXTRACTION         Outpatient Medications Marked as Taking for the 22 encounter (Office Visit) with Judy Cesar APRN   Medication Sig Dispense Refill   • albuterol sulfate  (90 Base) MCG/ACT inhaler Inhale 2 puffs Every 6 (Six) Hours As Needed for Wheezing. 6.7 g 0   • busPIRone (BUSPAR) 5 MG tablet      • FLUoxetine (PROzac) 40 MG capsule      • fluticasone (FLONASE) 50 MCG/ACT nasal spray 2 sprays into the nostril(s) as directed by provider Daily for 30 days. 16 g 11   • LO LOESTRIN FE 1 MG-10 MCG / 10 MCG tablet Take 1 tablet by mouth Daily.     •  [DISCONTINUED] FLUoxetine (PROzac) 20 MG capsule Take 20 mg by mouth Daily.         Patient has no known allergies.    Family History   Problem Relation Age of Onset   • Hyperlipidemia Mother    • Diabetes Father    • Hypertension Father        Social History     Socioeconomic History   • Marital status: Single   Tobacco Use   • Smoking status: Never Smoker   • Smokeless tobacco: Never Used   Vaping Use   • Vaping Use: Never used   Substance and Sexual Activity   • Alcohol use: Not Currently     Comment: mainly on weekends   • Drug use: Never   • Sexual activity: Defer       Review of Systems   Constitutional: Negative.    HENT: Positive for congestion. Negative for nosebleeds and sinus pressure.    Eyes: Negative.    Respiratory: Negative.    Cardiovascular: Negative.    Gastrointestinal: Negative.    Endocrine: Negative.    Genitourinary: Negative.    Musculoskeletal: Negative.    Allergic/Immunologic: Positive for environmental allergies.   Neurological: Negative.        Vitals:    02/16/22 0933   BP: 118/71   Pulse: 72   Temp: 97.8 °F (36.6 °C)       Body mass index is 29.56 kg/m².    Objective     Physical Exam  Vitals reviewed.   Constitutional:       Appearance: She is normal weight.   HENT:      Head: Normocephalic.      Right Ear: Hearing, tympanic membrane, ear canal and external ear normal.      Left Ear: Hearing, tympanic membrane, ear canal and external ear normal.      Nose: Congestion (left ) present.      Mouth/Throat:      Lips: Pink.      Mouth: Mucous membranes are moist.      Pharynx: Uvula midline.   Musculoskeletal:      Cervical back: Full passive range of motion without pain and normal range of motion.   Neurological:      Mental Status: She is alert.         Assessment/Plan   Diagnoses and all orders for this visit:    1. S/P sinus surgery (Primary)    2. Chronic pansinusitis    3. Seasonal allergic rhinitis due to pollen    4. Tatiana bullosa    5. Chronic sinusitis, unspecified  location    6. Allergic rhinitis, unspecified seasonality, unspecified trigger    7. Hypertrophy of both inferior nasal turbinates    8. Nasal septal deviation    9. Chronic allergic rhinitis    Other orders  -     azelastine (ASTELIN) 0.1 % nasal spray; 2 sprays into the nostril(s) as directed by provider 2 (Two) Times a Day. Use in each nostril as directed  Dispense: 30 mL; Refill: 11      * Surgery not found *  No orders of the defined types were placed in this encounter.    Return in about 6 months (around 2022) for Recheck.       Patient Instructions   CONTACT INFORMATION:  The main office phone number is [phone]. For emergencies after hours and on weekends, this number will convert over to our answering service and the on call provider will answer. Please try to keep non emergent phone calls/ questions to office hours 9am-5pm Monday through Friday.      [employer]  As an alternative, you can sign up and use the Epic MyChart system for more direct and quicker access for non emergent questions/ problems.  [email or web address] allows you to send messages to your doctor, view your test results, renew your prescriptions, schedule appointments, and more. To sign up, go to [facility] and click on the Sign Up Now link in the New User? box. Enter your [employer] Activation Code exactly as it appears below along with the last four digits of your Social Security Number and your Date of Birth () to complete the sign-up process. If you do not sign up before the expiration date, you must request a new code.     [employer] Activation Code: Activation code not generated  Current [employer] Status: Active     If you have questions, you can email [email or web address] or call [phone] to talk to our [employer] staff. Remember, [employer] is NOT to be used for urgent needs. For medical emergencies, dial 911.     IF YOU SMOKE OR USE TOBACCO PLEASE READ THE FOLLOWING:  Why is smoking bad for me?  Smoking  increases the risk of heart disease, lung disease, vascular disease, stroke, and cancer. If you smoke, STOP!        IF YOU SMOKE OR USE TOBACCO PLEASE READ THE FOLLOWING:  Why is smoking bad for me?  Smoking increases the risk of heart disease, lung disease, vascular disease, stroke, and cancer. If you smoke, STOP!     For more information:  Quit Now [place]  1-800-QUIT-NOW  https://[place].quitlogix.org/en-[place]/  For the best response, use your nasal sprays every day without skipping doses. It may take several weeks before the full effect is acheived.

## 2022-02-16 NOTE — PATIENT INSTRUCTIONS
CONTACT INFORMATION:  The main office phone number is [phone]. For emergencies after hours and on weekends, this number will convert over to our answering service and the on call provider will answer. Please try to keep non emergent phone calls/ questions to office hours 9am-5pm Monday through Friday.      [employer]  As an alternative, you can sign up and use the Epic MyChart system for more direct and quicker access for non emergent questions/ problems.  [employer] allows you to send messages to your doctor, view your test results, renew your prescriptions, schedule appointments, and more. To sign up, go to [employer] and click on the Sign Up Now link in the New User? box. Enter your [employer] Activation Code exactly as it appears below along with the last four digits of your Social Security Number and your Date of Birth () to complete the sign-up process. If you do not sign up before the expiration date, you must request a new code.     [employer] Activation Code: Activation code not generated  Current [employer] Status: Active     If you have questions, you can email [email or web address] or call [phone] to talk to our [employer] staff. Remember, [employer] is NOT to be used for urgent needs. For medical emergencies, dial 911.     IF YOU SMOKE OR USE TOBACCO PLEASE READ THE FOLLOWING:  Why is smoking bad for me?  Smoking increases the risk of heart disease, lung disease, vascular disease, stroke, and cancer. If you smoke, STOP!        IF YOU SMOKE OR USE TOBACCO PLEASE READ THE FOLLOWING:  Why is smoking bad for me?  Smoking increases the risk of heart disease, lung disease, vascular disease, stroke, and cancer. If you smoke, STOP!     For more information:  Quit Now [place]  -QUIT-NOW  https://[place].quitlogix.org/en-[place]/  For the best response, use your nasal sprays every day without skipping doses. It may take several weeks before the full effect is acheived.

## 2022-03-09 LAB
ALBUMIN SERPL-MCNC: 4.4 G/DL (ref 3.5–5.2)
ALP BLD-CCNC: 80 U/L (ref 35–104)
ALT SERPL-CCNC: 41 U/L (ref 5–33)
ANION GAP SERPL CALCULATED.3IONS-SCNC: 16 MMOL/L (ref 7–19)
AST SERPL-CCNC: 26 U/L (ref 5–32)
BASOPHILS ABSOLUTE: 0.1 K/UL (ref 0–0.2)
BASOPHILS RELATIVE PERCENT: 1.6 % (ref 0–1)
BILIRUB SERPL-MCNC: 0.4 MG/DL (ref 0.2–1.2)
BUN BLDV-MCNC: 9 MG/DL (ref 6–20)
CALCIUM SERPL-MCNC: 9.5 MG/DL (ref 8.6–10)
CHLORIDE BLD-SCNC: 102 MMOL/L (ref 98–111)
CO2: 23 MMOL/L (ref 22–29)
CREAT SERPL-MCNC: 0.6 MG/DL (ref 0.5–0.9)
EOSINOPHILS ABSOLUTE: 0.3 K/UL (ref 0–0.6)
EOSINOPHILS RELATIVE PERCENT: 4.5 % (ref 0–5)
GFR AFRICAN AMERICAN: >59
GFR NON-AFRICAN AMERICAN: >60
GLUCOSE BLD-MCNC: 85 MG/DL (ref 74–109)
HCT VFR BLD CALC: 43.9 % (ref 37–47)
HEMOGLOBIN: 14 G/DL (ref 12–16)
IMMATURE GRANULOCYTES #: 0 K/UL
LYMPHOCYTES ABSOLUTE: 2 K/UL (ref 1.1–4.5)
LYMPHOCYTES RELATIVE PERCENT: 31.1 % (ref 20–40)
MCH RBC QN AUTO: 28.6 PG (ref 27–31)
MCHC RBC AUTO-ENTMCNC: 31.9 G/DL (ref 33–37)
MCV RBC AUTO: 89.6 FL (ref 81–99)
MONOCYTES ABSOLUTE: 0.5 K/UL (ref 0–0.9)
MONOCYTES RELATIVE PERCENT: 7.9 % (ref 0–10)
NEUTROPHILS ABSOLUTE: 3.5 K/UL (ref 1.5–7.5)
NEUTROPHILS RELATIVE PERCENT: 54.6 % (ref 50–65)
PDW BLD-RTO: 12.6 % (ref 11.5–14.5)
PLATELET # BLD: 261 K/UL (ref 130–400)
PMV BLD AUTO: 11.3 FL (ref 9.4–12.3)
POTASSIUM SERPL-SCNC: 4.3 MMOL/L (ref 3.5–5)
RBC # BLD: 4.9 M/UL (ref 4.2–5.4)
SODIUM BLD-SCNC: 141 MMOL/L (ref 136–145)
TOTAL PROTEIN: 7.2 G/DL (ref 6.6–8.7)
TSH SERPL DL<=0.05 MIU/L-ACNC: 1.72 UIU/ML (ref 0.27–4.2)
VITAMIN B-12: 806 PG/ML (ref 211–946)
VITAMIN D 25-HYDROXY: 30.4 NG/ML
WBC # BLD: 6.4 K/UL (ref 4.8–10.8)

## 2022-03-15 ENCOUNTER — OFFICE VISIT (OUTPATIENT)
Dept: FAMILY MEDICINE CLINIC | Age: 22
End: 2022-03-15
Payer: COMMERCIAL

## 2022-03-15 VITALS
HEIGHT: 69 IN | RESPIRATION RATE: 16 BRPM | DIASTOLIC BLOOD PRESSURE: 64 MMHG | BODY MASS INDEX: 29.92 KG/M2 | SYSTOLIC BLOOD PRESSURE: 118 MMHG | OXYGEN SATURATION: 97 % | TEMPERATURE: 97.8 F | HEART RATE: 72 BPM | WEIGHT: 202 LBS

## 2022-03-15 DIAGNOSIS — E66.3 OVERWEIGHT (BMI 25.0-29.9): ICD-10-CM

## 2022-03-15 DIAGNOSIS — R53.82 CHRONIC FATIGUE: ICD-10-CM

## 2022-03-15 DIAGNOSIS — N92.6 IRREGULAR MENSTRUAL CYCLE: Primary | ICD-10-CM

## 2022-03-15 DIAGNOSIS — R79.89 ELEVATED LFTS: ICD-10-CM

## 2022-03-15 DIAGNOSIS — E55.9 VITAMIN D DEFICIENCY: ICD-10-CM

## 2022-03-15 PROCEDURE — 99214 OFFICE O/P EST MOD 30 MIN: CPT | Performed by: CLINICAL NURSE SPECIALIST

## 2022-03-15 RX ORDER — FLUTICASONE PROPIONATE 50 MCG
2 SPRAY, SUSPENSION (ML) NASAL DAILY
COMMUNITY
Start: 2021-08-12

## 2022-03-15 RX ORDER — AZELASTINE 1 MG/ML
2 SPRAY, METERED NASAL 2 TIMES DAILY
COMMUNITY
Start: 2022-02-16

## 2022-03-15 NOTE — PROGRESS NOTES
SUBJECTIVE:  Kya Peterson is a 20 y.o. who presents today for Discuss Labs      HPI    Ching Mir presents today for follow up. She is concerned about PCOS. Has been for some time. Had issues with irregular or absent period prior to starting OCP. She has continued with facial acne, despite treatment. She has trouble losing weight. She did stop her OCP earlier this month to establish baseline. Has chronic fatigue. Recent labs per psych are WNL including TFT. There was mild elevated LFT- has some chronic diarrhea. Admits to food sensitivities with bloating and distress. Has not tried elimination diet. No skin rashes after eating. Past Medical History:   Diagnosis Date    Asthma     Generalized anxiety disorder     Insomnia      Past Surgical History:   Procedure Laterality Date    SINUS SURGERY       Family History   Problem Relation Age of Onset    Diabetes Father     Heart Failure Maternal Grandmother     Heart Failure Maternal Grandfather     Heart Failure Paternal Grandmother     Stroke Paternal Grandmother     Diabetes Paternal Grandmother     Heart Failure Paternal Grandfather      Social History     Tobacco Use    Smoking status: Never Smoker    Smokeless tobacco: Never Used   Substance Use Topics    Alcohol use: No     Current Outpatient Medications   Medication Sig Dispense Refill    azelastine (ASTELIN) 0.1 % nasal spray 2 sprays by Nasal route 2 times daily      fluticasone (FLONASE) 50 MCG/ACT nasal spray 2 sprays by Nasal route daily      SUMAtriptan (IMITREX) 50 MG tablet Take 1 tablet at onset of headache, may repeat in 1 hour. Max 200mg per day. 9 tablet 0    albuterol sulfate HFA (VENTOLIN HFA) 108 (90 Base) MCG/ACT inhaler Inhale 2 puffs into the lungs 4 times daily as needed for Wheezing 1 Inhaler 5     No current facility-administered medications for this visit. No Known Allergies    Review of Systems   Constitutional: Positive for fatigue.  Negative for appetite change, chills, diaphoresis and fever. HENT: Negative for congestion, ear pain, facial swelling, hearing loss, postnasal drip, sinus pressure, sore throat and trouble swallowing. Eyes: Negative for pain, discharge, redness and visual disturbance. Respiratory: Negative for cough, chest tightness, shortness of breath and wheezing. Cardiovascular: Negative for chest pain and palpitations. Gastrointestinal: Positive for abdominal pain and diarrhea. Negative for constipation. Genitourinary: Positive for menstrual problem. Negative for difficulty urinating, dysuria, flank pain, frequency, hematuria and urgency. Musculoskeletal: Negative for arthralgias, back pain, joint swelling and neck pain. Skin: Negative for color change and rash. Neurological: Negative for dizziness, syncope, weakness, light-headedness and headaches. Psychiatric/Behavioral: Negative for confusion and dysphoric mood. The patient is not nervous/anxious. OBJECTIVE:  /64   Pulse 72   Temp 97.8 °F (36.6 °C) (Temporal)   Resp 16   Ht 5' 9\" (1.753 m)   Wt 202 lb (91.6 kg)   SpO2 97%   BMI 29.83 kg/m²    Physical Exam  Vitals reviewed. Constitutional:       General: She is not in acute distress. Appearance: She is well-developed. She is obese. She is not ill-appearing or toxic-appearing. HENT:      Head: Normocephalic and atraumatic. Eyes:      General:         Right eye: No discharge. Left eye: No discharge. Conjunctiva/sclera: Conjunctivae normal.      Pupils: Pupils are equal, round, and reactive to light. Neck:      Thyroid: No thyromegaly. Trachea: No tracheal deviation. Cardiovascular:      Rate and Rhythm: Normal rate and regular rhythm. Heart sounds: No murmur heard. Pulmonary:      Effort: Pulmonary effort is normal. No respiratory distress. Breath sounds: Normal breath sounds. No wheezing or rales.    Genitourinary:     Vagina: Normal. Musculoskeletal:         General: No deformity. Normal range of motion. Cervical back: Normal range of motion and neck supple. Skin:     General: Skin is warm and dry. Findings: No erythema or rash. Neurological:      Mental Status: She is alert and oriented to person, place, and time. Mental status is at baseline. Psychiatric:         Mood and Affect: Mood normal.         Behavior: Behavior normal.         Thought Content: Thought content normal.         Judgment: Judgment normal.         ASSESSMENT/PLAN:  1. Irregular menstrual cycle  - Testosterone Free and Total, Non-Male; Future  - Insulin Free & Total; Future  - Estradiol; Future  - Progesterone; Future  - Luteinizing Hormone; Future  - Follicle Stimulating Hormone; Future  - Prolactin; Future    2. Chronic fatigue  - CBC with Auto Differential; Future  - Comprehensive Metabolic Panel; Future  - Vitamin B12; Future  - C-Reactive Protein; Future    3. Overweight (BMI 25.0-29.9)  - Insulin Free & Total; Future    4. Elevated LFTs  - CBC with Auto Differential; Future  - Comprehensive Metabolic Panel; Future    5. Vitamin D deficiency  - Vitamin D 25 Hydroxy; Future    R/o PCOS and insulin resistance. Discussed elimination diet. Treatment options for weight management and acne, ovulation based on results. Add 1000iu vitamin D for deficiency. Return in about 3 months (around 6/15/2022).

## 2022-03-16 ASSESSMENT — ENCOUNTER SYMPTOMS
TROUBLE SWALLOWING: 0
CHEST TIGHTNESS: 0
EYE PAIN: 0
CONSTIPATION: 0
SORE THROAT: 0
ABDOMINAL PAIN: 1
BACK PAIN: 0
WHEEZING: 0
FACIAL SWELLING: 0
COUGH: 0
SHORTNESS OF BREATH: 0
SINUS PRESSURE: 0
EYE REDNESS: 0
DIARRHEA: 1
COLOR CHANGE: 0
EYE DISCHARGE: 0

## 2022-03-31 DIAGNOSIS — J01.90 ACUTE SINUSITIS, RECURRENCE NOT SPECIFIED, UNSPECIFIED LOCATION: Primary | ICD-10-CM

## 2022-03-31 RX ORDER — METHYLPREDNISOLONE 4 MG/1
TABLET ORAL
Qty: 21 TABLET | Refills: 0 | Status: SHIPPED | OUTPATIENT
Start: 2022-03-31 | End: 2022-04-06

## 2022-03-31 RX ORDER — AZITHROMYCIN 250 MG/1
TABLET, FILM COATED ORAL
Qty: 1 PACKET | Refills: 0 | Status: SHIPPED | OUTPATIENT
Start: 2022-03-31 | End: 2022-05-25 | Stop reason: SDUPTHER

## 2022-05-18 ENCOUNTER — TELEPHONE (OUTPATIENT)
Dept: FAMILY MEDICINE CLINIC | Age: 22
End: 2022-05-18

## 2022-05-25 DIAGNOSIS — J01.90 ACUTE SINUSITIS, RECURRENCE NOT SPECIFIED, UNSPECIFIED LOCATION: ICD-10-CM

## 2022-05-25 RX ORDER — AZITHROMYCIN 250 MG/1
TABLET, FILM COATED ORAL
Qty: 1 PACKET | Refills: 0 | Status: SHIPPED | OUTPATIENT
Start: 2022-05-25 | End: 2022-06-15 | Stop reason: ALTCHOICE

## 2022-05-25 RX ORDER — METHYLPREDNISOLONE 4 MG/1
TABLET ORAL
Qty: 21 TABLET | Refills: 0 | Status: SHIPPED | OUTPATIENT
Start: 2022-05-25 | End: 2022-05-31

## 2022-06-07 ENCOUNTER — TELEPHONE (OUTPATIENT)
Dept: FAMILY MEDICINE CLINIC | Age: 22
End: 2022-06-07

## 2022-06-07 DIAGNOSIS — R79.89 ELEVATED LFTS: ICD-10-CM

## 2022-06-07 DIAGNOSIS — R53.82 CHRONIC FATIGUE: ICD-10-CM

## 2022-06-07 DIAGNOSIS — N92.6 IRREGULAR MENSTRUAL CYCLE: ICD-10-CM

## 2022-06-07 DIAGNOSIS — E66.3 OVERWEIGHT (BMI 25.0-29.9): ICD-10-CM

## 2022-06-07 DIAGNOSIS — E55.9 VITAMIN D DEFICIENCY: ICD-10-CM

## 2022-06-07 LAB
ALBUMIN SERPL-MCNC: 4.5 G/DL (ref 3.5–5.2)
ALP BLD-CCNC: 85 U/L (ref 35–104)
ALT SERPL-CCNC: 35 U/L (ref 5–33)
ANION GAP SERPL CALCULATED.3IONS-SCNC: 11 MMOL/L (ref 7–19)
AST SERPL-CCNC: 22 U/L (ref 5–32)
BASOPHILS ABSOLUTE: 0.1 K/UL (ref 0–0.2)
BASOPHILS RELATIVE PERCENT: 1.2 % (ref 0–1)
BILIRUB SERPL-MCNC: 0.6 MG/DL (ref 0.2–1.2)
BUN BLDV-MCNC: 7 MG/DL (ref 6–20)
C-REACTIVE PROTEIN: <0.3 MG/DL (ref 0–0.5)
CALCIUM SERPL-MCNC: 9.3 MG/DL (ref 8.6–10)
CHLORIDE BLD-SCNC: 100 MMOL/L (ref 98–111)
CO2: 26 MMOL/L (ref 22–29)
CREAT SERPL-MCNC: 0.7 MG/DL (ref 0.5–0.9)
EOSINOPHILS ABSOLUTE: 0.2 K/UL (ref 0–0.6)
EOSINOPHILS RELATIVE PERCENT: 2.8 % (ref 0–5)
ESTRADIOL LEVEL: 54.7 PG/ML
FOLLICLE STIMULATING HORMONE: 5.3 MIU/ML
GFR AFRICAN AMERICAN: >59
GFR NON-AFRICAN AMERICAN: >60
GLUCOSE BLD-MCNC: 91 MG/DL (ref 74–109)
HCT VFR BLD CALC: 43 % (ref 37–47)
HEMOGLOBIN: 13.9 G/DL (ref 12–16)
IMMATURE GRANULOCYTES #: 0 K/UL
LUTEINIZING HORMONE: 16.3 MIU/ML
LYMPHOCYTES ABSOLUTE: 2.2 K/UL (ref 1.1–4.5)
LYMPHOCYTES RELATIVE PERCENT: 30 % (ref 20–40)
MCH RBC QN AUTO: 28.8 PG (ref 27–31)
MCHC RBC AUTO-ENTMCNC: 32.3 G/DL (ref 33–37)
MCV RBC AUTO: 89 FL (ref 81–99)
MONOCYTES ABSOLUTE: 0.6 K/UL (ref 0–0.9)
MONOCYTES RELATIVE PERCENT: 8.1 % (ref 0–10)
NEUTROPHILS ABSOLUTE: 4.2 K/UL (ref 1.5–7.5)
NEUTROPHILS RELATIVE PERCENT: 57.5 % (ref 50–65)
PDW BLD-RTO: 13.1 % (ref 11.5–14.5)
PLATELET # BLD: 267 K/UL (ref 130–400)
PMV BLD AUTO: 11.7 FL (ref 9.4–12.3)
POTASSIUM SERPL-SCNC: 4 MMOL/L (ref 3.5–5)
PROGESTERONE LEVEL: 0.25 NG/ML
PROLACTIN: 15.59 NG/ML (ref 4.79–23.3)
RBC # BLD: 4.83 M/UL (ref 4.2–5.4)
SODIUM BLD-SCNC: 137 MMOL/L (ref 136–145)
TOTAL PROTEIN: 6.8 G/DL (ref 6.6–8.7)
VITAMIN B-12: 804 PG/ML (ref 211–946)
VITAMIN D 25-HYDROXY: 33.4 NG/ML
WBC # BLD: 7.2 K/UL (ref 4.8–10.8)

## 2022-06-07 NOTE — TELEPHONE ENCOUNTER
----- Message from [facility] sent at 6/6/2022  3:29 PM CDT -----  Subject: Message to Provider    QUESTIONS  Information for Provider? Pt wants to know if appt on 06/15/2022 can be a   VV. Please call and let pt know either way  ---------------------------------------------------------------------------  --------------  CALL BACK INFO  What is the best way for the office to contact you? OK to leave message on   voicemail  Preferred Call Back Phone Number? [phone]  ---------------------------------------------------------------------------  --------------  SCRIPT ANSWERS  Relationship to Patient?  Self

## 2022-06-10 LAB
INSULIN FREE: 13 UIU/ML (ref 3–25)
INSULIN: 17 UIU/ML (ref 3–25)

## 2022-06-15 ENCOUNTER — TELEMEDICINE (OUTPATIENT)
Dept: FAMILY MEDICINE CLINIC | Age: 22
End: 2022-06-15
Payer: COMMERCIAL

## 2022-06-15 DIAGNOSIS — E28.2 PCOS (POLYCYSTIC OVARIAN SYNDROME): Primary | ICD-10-CM

## 2022-06-15 DIAGNOSIS — L70.0 ACNE VULGARIS: ICD-10-CM

## 2022-06-15 DIAGNOSIS — E66.3 OVERWEIGHT (BMI 25.0-29.9): ICD-10-CM

## 2022-06-15 PROCEDURE — 99214 OFFICE O/P EST MOD 30 MIN: CPT | Performed by: CLINICAL NURSE SPECIALIST

## 2022-06-15 RX ORDER — DROSPIRENONE AND ETHINYL ESTRADIOL 0.02-3(28)
1 KIT ORAL DAILY
Qty: 1 PACKET | Refills: 11 | Status: SHIPPED | OUTPATIENT
Start: 2022-06-15 | End: 2022-10-28 | Stop reason: SDUPTHER

## 2022-06-15 ASSESSMENT — ENCOUNTER SYMPTOMS
COLOR CHANGE: 0
CONSTIPATION: 0
TROUBLE SWALLOWING: 0
EYE REDNESS: 0
SINUS PRESSURE: 0
SHORTNESS OF BREATH: 0
CHEST TIGHTNESS: 0
DIARRHEA: 0
SORE THROAT: 0
WHEEZING: 0
EYE PAIN: 0
EYE DISCHARGE: 0
COUGH: 0
BACK PAIN: 0
FACIAL SWELLING: 0
ABDOMINAL PAIN: 0

## 2022-06-15 NOTE — PROGRESS NOTES
SUBJECTIVE:  Neel Bellamy is a 20 y.o. who presents today for Discuss Labs      HPI    [facility] follow up on labs. She has been off her OCP and prozac for 3 months. Overall her fatigue is better. Her period is light and slightly irregular. Has difficulty losing weight, ongoing facial acne and coarse thick facial hair to chin area. Review of labs and symptoms suggestive of PCOS with 1:3 FSH:LH ratio and high normal prolactin. Testosterone pending, but high in the past.  She was on lo lo estrin OCP. Her estradiol and progesterone on low on labs. Previously took Bygget 64. Was on aldactone briefly. Past Medical History:   Diagnosis Date    Asthma     Generalized anxiety disorder     Insomnia      Past Surgical History:   Procedure Laterality Date    SINUS SURGERY       Family History   Problem Relation Age of Onset    Diabetes Father     Heart Failure Maternal Grandmother     Heart Failure Maternal Grandfather     Heart Failure Paternal Grandmother     Stroke Paternal Grandmother     Diabetes Paternal Grandmother     Heart Failure Paternal Grandfather      Social History     Tobacco Use    Smoking status: Never Smoker    Smokeless tobacco: Never Used   Substance Use Topics    Alcohol use: No     Current Outpatient Medications   Medication Sig Dispense Refill    drospirenone-ethinyl estradiol (ELVIS) 3-0.02 MG per tablet Take 1 tablet by mouth daily 1 packet 11    metFORMIN (GLUCOPHAGE) 500 MG tablet Take 2 tablets by mouth 2 times daily (with meals) 120 tablet 1    azelastine (ASTELIN) 0.1 % nasal spray 2 sprays by Nasal route 2 times daily      fluticasone (FLONASE) 50 MCG/ACT nasal spray 2 sprays by Nasal route daily      SUMAtriptan (IMITREX) 50 MG tablet Take 1 tablet at onset of headache, may repeat in 1 hour. Max 200mg per day.  9 tablet 0    albuterol sulfate HFA (VENTOLIN HFA) 108 (90 Base) MCG/ACT inhaler Inhale 2 puffs into the lungs 4 times daily as needed for Wheezing 1 Inhaler 5     No current facility-administered medications for this visit. No Known Allergies    Review of Systems   Constitutional: Positive for fatigue and unexpected weight change. Negative for appetite change, chills, diaphoresis and fever. HENT: Negative for congestion, ear pain, facial swelling, hearing loss, postnasal drip, sinus pressure, sore throat and trouble swallowing. Eyes: Negative for pain, discharge, redness and visual disturbance. Respiratory: Negative for cough, chest tightness, shortness of breath and wheezing. Cardiovascular: Negative for chest pain and palpitations. Gastrointestinal: Negative for abdominal pain, constipation and diarrhea. Genitourinary: Negative for difficulty urinating, dysuria, flank pain, frequency, hematuria, menstrual problem and urgency. Musculoskeletal: Negative for arthralgias, back pain, joint swelling and neck pain. Skin: Negative for color change and rash. Neurological: Negative for dizziness, syncope, weakness, light-headedness and headaches. Hematological: Negative for adenopathy. Does not bruise/bleed easily. Psychiatric/Behavioral: Negative for confusion and dysphoric mood. The patient is not nervous/anxious. OBJECTIVE:  There were no vitals taken for this visit. Physical Exam  Constitutional:       General: She is not in acute distress. Appearance: She is not ill-appearing or toxic-appearing. Pulmonary:      Effort: No respiratory distress. Neurological:      Mental Status: She is alert and oriented to person, place, and time. Psychiatric:         Mood and Affect: Mood normal.         Behavior: Behavior normal.         Thought Content: Thought content normal.         Judgment: Judgment normal.         ASSESSMENT/PLAN:  1. PCOS (polycystic ovarian syndrome)  New diagnosis. Reviewed labs with patient and med options.   She does not desire pregnancy currently, so will rx to manage hormonal symptoms. Add metformin to help with weight. Aldactone option.  - drospirenone-ethinyl estradiol (ELVIS) 3-0.02 MG per tablet; Take 1 tablet by mouth daily  Dispense: 1 packet; Refill: 11  - metFORMIN (GLUCOPHAGE) 500 MG tablet; Take 2 tablets by mouth 2 times daily (with meals)  Dispense: 120 tablet; Refill: 1    2. Overweight (BMI 25.0-29. 9)  Unimproved. Add metformin due to above known dx. Start once daily, titrate up weekly as tolerated. Phentermine option in future if needed  - metFORMIN (GLUCOPHAGE) 500 MG tablet; Take 2 tablets by mouth 2 times daily (with meals)  Dispense: 120 tablet; Refill: 1    3. Acne vulgaris  Hormonal.  As above. Joseph Everett, was evaluated through a synchronous (real-time) audio-video encounter. The patient (or guardian if applicable) is aware that this is a billable service, which includes applicable co-pays. This Virtual Visit was conducted with patient's (and/or legal guardian's) consent. The visit was conducted pursuant to the emergency declaration under the [facility], [address] [place] [address] authority and the [employer] General Act. Patient identification was verified, and a caregiver was present when appropriate. The patient was located at Home: [address]. Provider was located at [place] [address] (Appt Dept): [facility] [address] [place] [address],  [facility].              Return in about 3 months (around 9/15/2022) for physical.

## 2022-06-18 LAB
SEX HORMONE BINDING GLOBULIN: 22 NMOL/L (ref 25–122)
TESTOSTERONE FREE: 14.7 PG/ML (ref 0.8–7.4)
TESTOSTERONE, FEMALES/CHILDREN: 72 NG/DL (ref 9–55)

## 2022-08-17 ENCOUNTER — OFFICE VISIT (OUTPATIENT)
Dept: OTOLARYNGOLOGY | Facility: CLINIC | Age: 22
End: 2022-08-17

## 2022-08-17 VITALS
DIASTOLIC BLOOD PRESSURE: 73 MMHG | BODY MASS INDEX: 29.83 KG/M2 | HEART RATE: 70 BPM | SYSTOLIC BLOOD PRESSURE: 127 MMHG | HEIGHT: 69 IN | TEMPERATURE: 98.4 F | WEIGHT: 201.4 LBS

## 2022-08-17 DIAGNOSIS — Z98.890 S/P SINUS SURGERY: Primary | ICD-10-CM

## 2022-08-17 DIAGNOSIS — J33.9 NASAL POLYPOSIS: ICD-10-CM

## 2022-08-17 DIAGNOSIS — J30.9 ALLERGIC RHINITIS, UNSPECIFIED SEASONALITY, UNSPECIFIED TRIGGER: ICD-10-CM

## 2022-08-17 DIAGNOSIS — J30.1 SEASONAL ALLERGIC RHINITIS DUE TO POLLEN: ICD-10-CM

## 2022-08-17 PROCEDURE — 31231 NASAL ENDOSCOPY DX: CPT | Performed by: NURSE PRACTITIONER

## 2022-08-17 RX ORDER — DROSPIRENONE AND ETHINYL ESTRADIOL 0.02-3(28)
1 KIT ORAL DAILY
COMMUNITY
Start: 2022-06-15

## 2022-08-17 NOTE — PROGRESS NOTES
YOB: 2000  Location: [facility]  Location Address: [address] [place] [address], [facility], [address] [place] [address]  Location Phone: [phone]    Chief Complaint   Patient presents with   • Sinus Problem     Congestion        History of Present Illness  Caprice Thibodeaux is a 22 y.o. female.  Caprice Thibodeaux is here for follow up of ENT complaints. The patient is s/p bilateral functional endoscopic anterior ethmoidectomy, septoplasty, bilateral inferior turbinate reduction and bilateral bib bullosa resection and resection of right nasal polyp 2020.  She has had a relatively normal postoperative course.   Patient was having some complaints of increased congestion that is worse when she laid down at night. At last visit astelin was added to flonase nasal sprays   She states she continues to have some intermittent congestion worse when she lays down at night, but it is not consistently in one side vs the other and is not every night          Past Medical History:   Diagnosis Date   • Anxiety    • Asthma    • Enlarged thyroid    • Sinusitis        Past Surgical History:   Procedure Laterality Date   • ENDOSCOPIC FUNCTIONAL SINUS SURGERY (FESS) Bilateral 2020    Procedure: Bilateral functional endoscopic anterior ethmoidectomy with bilateral middle meatal antrostomies Septoplasty Bilateral bib bullosa resection Bilateral inferior turbinate reduction via Coblation Resection of right nasal polyposis;  Surgeon: David Snyder MD;  Location: [facility];  Service: ENT;  Laterality: Bilateral;   • WISDOM TOOTH EXTRACTION         Outpatient Medications Marked as Taking for the 22 encounter (Office Visit) with Judy Cesar APRN   Medication Sig Dispense Refill   • albuterol sulfate  (90 Base) MCG/ACT inhaler Inhale 2 puffs Every 6 (Six) Hours As Needed for Wheezing. 6.7 g 0   • azelastine (ASTELIN) 0.1 % nasal spray 2 sprays into the nostril(s) as directed by provider 2 (Two) Times a Day. Use  in each nostril as directed 30 mL 11   • busPIRone (BUSPAR) 5 MG tablet      • drospirenone-ethinyl estradiol (EFFIE,GIANVI) 3-0.02 MG per tablet Take 1 tablet by mouth Daily.     • FLUoxetine (PROzac) 40 MG capsule      • fluticasone (FLONASE) 50 MCG/ACT nasal spray 2 sprays into the nostril(s) as directed by provider Daily for 30 days. 16 g 11   • metFORMIN (GLUCOPHAGE) 500 MG tablet Take 1,000 mg by mouth.     • [DISCONTINUED] LO LOESTRIN FE 1 MG-10 MCG / 10 MCG tablet Take 1 tablet by mouth Daily.         Patient has no known allergies.    Family History   Problem Relation Age of Onset   • Hyperlipidemia Mother    • Diabetes Father    • Hypertension Father        Social History     Socioeconomic History   • Marital status: Single   Tobacco Use   • Smoking status: Never Smoker   • Smokeless tobacco: Never Used   Vaping Use   • Vaping Use: Never used   Substance and Sexual Activity   • Alcohol use: Not Currently     Comment: mainly on weekends   • Drug use: Never   • Sexual activity: Defer       Review of Systems   Constitutional: Negative.    HENT: Negative.    Eyes: Negative.    Respiratory: Negative.    Cardiovascular: Negative.    Gastrointestinal: Negative.    Endocrine: Negative.    Genitourinary: Negative.    Musculoskeletal: Negative.    Neurological: Negative.        Vitals:    08/17/22 1030   BP: 127/73   Pulse: 70   Temp: 98.4 °F (36.9 °C)       Body mass index is 29.74 kg/m².    Objective       Physical Exam  Vitals reviewed.   Constitutional:       Appearance: She is normal weight.   HENT:      Head: Normocephalic.      Right Ear: Hearing, tympanic membrane, ear canal and external ear normal.      Left Ear: Hearing, tympanic membrane, ear canal and external ear normal.      Nose: Rhinorrhea present. Rhinorrhea is clear.      Mouth/Throat:      Lips: Pink.      Mouth: Mucous membranes are moist.      Pharynx: Uvula midline.   Neurological:      Mental Status: She is alert.       Nasal  endoscopy    Date/Time: 8/17/2022 11:41 AM  Performed by: Judy Cesar APRN  Authorized by: Judy Cesar APRN     Procedure details:     Indications: sino-nasal symptoms      Medication:  None    Instrument: rigid nasal endoscopy      Scope location: bilateral nare    Nasal cavity:     Right inferior turbinates: post-surgical changes      Left inferior turbinates: post-surgical changes    Sinus/ Nasopharynx:      comment:  Right nasopharynx with mucoid drainage     Left nasopharynx: normal    Post-procedure details:     Patient tolerance of procedure:  Tolerated well  Comments:      No polyps identified         Assessment & Plan   Diagnoses and all orders for this visit:    1. S/P sinus surgery (Primary)    2. Seasonal allergic rhinitis due to pollen    3. Allergic rhinitis, unspecified seasonality, unspecified trigger    4. Nasal polyposis    Other orders  -     $ Nasal / Sinus Endoscopy      * Surgery not found *  Orders Placed This Encounter   Procedures   • $ Nasal / Sinus Endoscopy     This order was created via procedure documentation     Order Specific Question:   Release to patient     Answer:   Routine Release     Return in about 6 months (around 2/17/2023) for Recheck.     Will re evaluate in 6 months if ongoing issues will consider repeat ct scan   Continue nasal sprays     Patient Instructions   For the best response, use your nasal sprays every day without skipping doses. It may take several weeks before the full effect is acheived.

## 2022-08-31 ENCOUNTER — PATIENT MESSAGE (OUTPATIENT)
Dept: FAMILY MEDICINE CLINIC | Age: 22
End: 2022-08-31

## 2022-08-31 DIAGNOSIS — J01.90 ACUTE SINUSITIS, RECURRENCE NOT SPECIFIED, UNSPECIFIED LOCATION: Primary | ICD-10-CM

## 2022-08-31 RX ORDER — AZITHROMYCIN 250 MG/1
TABLET, FILM COATED ORAL
Qty: 1 PACKET | Refills: 0 | Status: SHIPPED | OUTPATIENT
Start: 2022-08-31 | End: 2022-10-28 | Stop reason: ALTCHOICE

## 2022-08-31 RX ORDER — METHYLPREDNISOLONE 4 MG/1
TABLET ORAL
Qty: 21 TABLET | Refills: 0 | Status: SHIPPED | OUTPATIENT
Start: 2022-08-31 | End: 2022-09-06

## 2022-09-14 ENCOUNTER — PATIENT MESSAGE (OUTPATIENT)
Dept: FAMILY MEDICINE CLINIC | Age: 22
End: 2022-09-14

## 2022-09-14 DIAGNOSIS — L30.9 DERMATITIS: Primary | ICD-10-CM

## 2022-09-15 RX ORDER — METHYLPREDNISOLONE 4 MG/1
TABLET ORAL
Qty: 21 TABLET | Refills: 0 | Status: SHIPPED | OUTPATIENT
Start: 2022-09-15 | End: 2022-09-21

## 2022-09-15 RX ORDER — HYDROXYZINE HYDROCHLORIDE 25 MG/1
25 TABLET, FILM COATED ORAL EVERY 8 HOURS PRN
Qty: 30 TABLET | Refills: 0 | Status: SHIPPED | OUTPATIENT
Start: 2022-09-15 | End: 2022-09-25

## 2022-09-30 ENCOUNTER — OFFICE VISIT (OUTPATIENT)
Age: 22
End: 2022-09-30
Payer: COMMERCIAL

## 2022-09-30 VITALS
WEIGHT: 202.13 LBS | OXYGEN SATURATION: 97 % | RESPIRATION RATE: 16 BRPM | HEIGHT: 69 IN | BODY MASS INDEX: 29.94 KG/M2 | SYSTOLIC BLOOD PRESSURE: 122 MMHG | HEART RATE: 80 BPM | DIASTOLIC BLOOD PRESSURE: 74 MMHG | TEMPERATURE: 97.5 F

## 2022-09-30 DIAGNOSIS — J06.9 UPPER RESPIRATORY TRACT INFECTION, UNSPECIFIED TYPE: Primary | ICD-10-CM

## 2022-09-30 PROCEDURE — 99213 OFFICE O/P EST LOW 20 MIN: CPT | Performed by: NURSE PRACTITIONER

## 2022-09-30 RX ORDER — AMOXICILLIN 500 MG/1
500 CAPSULE ORAL 2 TIMES DAILY
Qty: 20 CAPSULE | Refills: 0 | Status: SHIPPED | OUTPATIENT
Start: 2022-09-30 | End: 2022-10-10

## 2022-09-30 ASSESSMENT — ENCOUNTER SYMPTOMS
CHEST TIGHTNESS: 0
SHORTNESS OF BREATH: 0
SINUS PRESSURE: 1
ABDOMINAL PAIN: 0
SORE THROAT: 1
EYE DISCHARGE: 0
WHEEZING: 0
ABDOMINAL DISTENTION: 0
STRIDOR: 0
EYE PAIN: 0
COUGH: 1
COLOR CHANGE: 0
TROUBLE SWALLOWING: 0

## 2022-09-30 NOTE — PROGRESS NOTES
[address]  [address] [address] Henri [place]gado [address]  Dept: [phone]  Dept Fax: [phone]  Loc: [phone]    Reddy Farris is a 20 y.o. female who presents today for her medical conditions/complaints as noted below. Reddy Farris is complaining of Congestion (Started 3 days ago ) and Pharyngitis        HPI:   Pharyngitis  Associated symptoms include congestion, coughing and a sore throat. Pertinent negatives include no abdominal pain, arthralgias, chest pain, chills, fatigue, fever, neck pain, numbness, rash or weakness. Mariella Moseley is complaining of cough congestion sinus pressure and minor sore throat for 3 days. Patient denies fever or body aches. Is over-the-counter medication.   Past Medical History:   Diagnosis Date    Asthma     Generalized anxiety disorder     Insomnia        Past Surgical History:   Procedure Laterality Date    SINUS SURGERY         Family History   Problem Relation Age of Onset    Diabetes Father     Heart Failure Maternal Grandmother     Heart Failure Maternal Grandfather     Heart Failure Paternal Grandmother     Stroke Paternal Grandmother     Diabetes Paternal Grandmother     Heart Failure Paternal Grandfather        Social History     Tobacco Use    Smoking status: Never    Smokeless tobacco: Never   Substance Use Topics    Alcohol use: No        Current Outpatient Medications   Medication Sig Dispense Refill    amoxicillin (AMOXIL) 500 MG capsule Take 1 capsule by mouth 2 times daily for 10 days 20 capsule 0    drospirenone-ethinyl estradiol (ELVIS) 3-0.02 MG per tablet Take 1 tablet by mouth daily 1 packet 11    metFORMIN (GLUCOPHAGE) 500 MG tablet Take 2 tablets by mouth 2 times daily (with meals) 120 tablet 1    azelastine (ASTELIN) 0.1 % nasal spray 2 sprays by Nasal route 2 times daily      fluticasone (FLONASE) 50 MCG/ACT nasal spray 2 sprays by Nasal route daily      SUMAtriptan (IMITREX) 50 MG tablet Take 1 tablet at onset of headache, may repeat in 1 hour. Max 200mg per day. 9 tablet 0    albuterol sulfate HFA (VENTOLIN HFA) 108 (90 Base) MCG/ACT inhaler Inhale 2 puffs into the lungs 4 times daily as needed for Wheezing 1 Inhaler 5    azithromycin (ZITHROMAX) 250 MG tablet Take 2 tabs (500 mg) on Day 1, and take 1 tab (250 mg) on days 2 through 5. (Patient not taking: Reported on 9/30/2022) 1 packet 0     No current facility-administered medications for this visit. No Known Allergies    Health Maintenance   Topic Date Due    Varicella vaccine (1 of 2 - 2-dose childhood series) Never done    Pneumococcal 0-64 years Vaccine (1 - PCV) Never done    HPV vaccine (1 - 2-dose series) Never done    HIV screen  Never done    Hepatitis C screen  Never done    COVID-19 Vaccine (3 - Booster for Pfizer series) 09/09/2021    [address] screen  12/29/2021    Depression Screen  03/24/2022    Flu vaccine (1) Never done    Pap smear  12/29/2023    DTaP/Tdap/Td vaccine (3 - Td or Tdap) 11/03/2025    Meningococcal (ACWY) vaccine  Completed    Hepatitis A vaccine  Aged Out    Hepatitis B vaccine  Aged Out    Hib vaccine  Aged Out       Subjective:   Review of Systems   Constitutional:  Negative for chills, fatigue and fever. HENT:  Positive for congestion, sinus pressure and sore throat. Negative for trouble swallowing. Eyes:  Negative for pain and discharge. Respiratory:  Positive for cough. Negative for chest tightness, shortness of breath, wheezing and stridor. Cardiovascular:  Negative for chest pain and palpitations. Gastrointestinal:  Negative for abdominal distention and abdominal pain. Genitourinary:  Negative for difficulty urinating, dysuria and hematuria. Musculoskeletal:  Negative for arthralgias, neck pain and neck stiffness. Skin:  Negative for color change and rash. Neurological:  Negative for dizziness, syncope, speech difficulty, weakness and numbness.    Psychiatric/Behavioral: Negative for confusion and suicidal ideas. Objective    Physical Exam  Vitals and nursing note reviewed. Constitutional:       General: She is not in acute distress. Appearance: Normal appearance. HENT:      Head: Normocephalic. Right Ear: Tympanic membrane, ear canal and external ear normal.      Left Ear: Tympanic membrane, ear canal and external ear normal.      Nose: Rhinorrhea present. No congestion. Mouth/Throat:      Mouth: Mucous membranes are moist.      Pharynx: Oropharynx is clear. No posterior oropharyngeal erythema. Eyes:      Conjunctiva/sclera: Conjunctivae normal.      Pupils: Pupils are equal, round, and reactive to light. Cardiovascular:      Rate and Rhythm: Normal rate and regular rhythm. Pulses: Normal pulses. Heart sounds: Normal heart sounds. No murmur heard. Pulmonary:      Effort: Pulmonary effort is normal. No respiratory distress. Breath sounds: Normal breath sounds. No stridor. No wheezing. Abdominal:      General: Abdomen is flat. Bowel sounds are normal. There is no distension. Tenderness: There is no abdominal tenderness. Musculoskeletal:         General: No swelling or deformity. Normal range of motion. Cervical back: Normal range of motion. No rigidity or tenderness. Skin:     General: Skin is warm and dry. Findings: No rash. Neurological:      General: No focal deficit present. Mental Status: She is alert and oriented to person, place, and time. Sensory: No sensory deficit. /74   Pulse 80   Temp 97.5 °F (36.4 °C)   Resp 16   Ht 5' 9\" (1.753 m)   Wt 202 lb 2 oz (91.7 kg)   SpO2 97%   BMI 29.85 kg/m²     Assessment         Diagnosis Orders   1. Upper respiratory tract infection, unspecified type  amoxicillin (AMOXIL) 500 MG capsule          Plan   Encourage fluids, Tylenol/Ibuprofen, OTC decongestants   Antibiotic sent to pharmacy.   If symptoms worsen or fail to improve follow-up with office or PCP  If SOB, chest pain, or high persistent fevers occur, go to ER    Patient verbalized understanding and agrees to plan    No orders of the defined types were placed in this encounter. No results found for this visit on 09/30/22. Orders Placed This Encounter   Medications    amoxicillin (AMOXIL) 500 MG capsule     Sig: Take 1 capsule by mouth 2 times daily for 10 days     Dispense:  20 capsule     Refill:  0      New Prescriptions    AMOXICILLIN (AMOXIL) 500 MG CAPSULE    Take 1 capsule by mouth 2 times daily for 10 days        No follow-ups on file. Discussed use, benefits, and side effects of any prescribed medications. All patient questions were answered. Patient voiced understanding of care plan. Patient was given educational materials - see patient instructions below. Patient Instructions   Encourage fluids, Tylenol/Ibuprofen, OTC decongestants   Antibiotic sent to pharmacy.   If symptoms worsen or fail to improve follow-up with office or PCP  If SOB, chest pain, or high persistent fevers occur, go to ER    Patient verbalized understanding and agrees to plan      Electronically signed by DESIRE Kerr CNP on 9/30/2022 at 11:12 AM

## 2022-09-30 NOTE — PATIENT INSTRUCTIONS
Encourage fluids, Tylenol/Ibuprofen, OTC decongestants   Antibiotic sent to pharmacy.   If symptoms worsen or fail to improve follow-up with office or PCP  If SOB, chest pain, or high persistent fevers occur, go to ER    Patient verbalized understanding and agrees to plan

## 2022-10-28 ENCOUNTER — OFFICE VISIT (OUTPATIENT)
Dept: FAMILY MEDICINE CLINIC | Age: 22
End: 2022-10-28
Payer: COMMERCIAL

## 2022-10-28 VITALS
BODY MASS INDEX: 29.66 KG/M2 | WEIGHT: 200.25 LBS | TEMPERATURE: 98.6 F | SYSTOLIC BLOOD PRESSURE: 124 MMHG | OXYGEN SATURATION: 98 % | HEART RATE: 92 BPM | DIASTOLIC BLOOD PRESSURE: 74 MMHG | HEIGHT: 69 IN

## 2022-10-28 DIAGNOSIS — L70.0 ACNE VULGARIS: Primary | ICD-10-CM

## 2022-10-28 DIAGNOSIS — E28.2 PCOS (POLYCYSTIC OVARIAN SYNDROME): ICD-10-CM

## 2022-10-28 DIAGNOSIS — G43.109 MIGRAINE WITH AURA AND WITHOUT STATUS MIGRAINOSUS, NOT INTRACTABLE: ICD-10-CM

## 2022-10-28 PROCEDURE — 99214 OFFICE O/P EST MOD 30 MIN: CPT | Performed by: NURSE PRACTITIONER

## 2022-10-28 RX ORDER — ISOTRETINOIN 40 MG/1
40 CAPSULE ORAL 2 TIMES DAILY
Qty: 60 CAPSULE | Refills: 0
Start: 2022-10-28 | End: 2022-11-27

## 2022-10-28 RX ORDER — DROSPIRENONE AND ETHINYL ESTRADIOL 0.02-3(28)
1 KIT ORAL DAILY
Qty: 1 PACKET | Refills: 11 | Status: SHIPPED | OUTPATIENT
Start: 2022-10-28

## 2022-10-28 ASSESSMENT — PATIENT HEALTH QUESTIONNAIRE - PHQ9
SUM OF ALL RESPONSES TO PHQ9 QUESTIONS 1 & 2: 0
1. LITTLE INTEREST OR PLEASURE IN DOING THINGS: 0
SUM OF ALL RESPONSES TO PHQ QUESTIONS 1-9: 0
SUM OF ALL RESPONSES TO PHQ QUESTIONS 1-9: 0
2. FEELING DOWN, DEPRESSED OR HOPELESS: 0
SUM OF ALL RESPONSES TO PHQ QUESTIONS 1-9: 0
SUM OF ALL RESPONSES TO PHQ QUESTIONS 1-9: 0

## 2022-10-28 ASSESSMENT — ENCOUNTER SYMPTOMS
NAUSEA: 0
WHEEZING: 0
COUGH: 0
SHORTNESS OF BREATH: 0
DIARRHEA: 0
ABDOMINAL PAIN: 0
ROS SKIN COMMENTS: ACNE
SORE THROAT: 0
CHEST TIGHTNESS: 0

## 2022-10-28 NOTE — PROGRESS NOTES
Stacy Bowers is a 20 y.o. female who presents today for  Chief Complaint   Patient presents with    Established New Doctor       HPI:  Here to transition care from [facility]. She has history of PCOS treated with OCP, metformin. She has difficulty tolerating the metformin due to loose stools so is not taking regularly. She is taking OCP and tolerates well. Periods are currently stable on OCP. She has an appointment with GYN next month for Pap. She has a history of recurrent acne. She took Accutane in the remote past and was effective. She started back on it earlier this month for worsening acne managed per [facility] dermatology. She is tolerating well. They are monitoring labs. She is taking OCP due to Accutane as well. History of migraines, currently stable. She has not needed her sumatriptan since December 2021. She relates previous migraines to stress. Improved since she graduated from college. She has had visual disturbance, aura with past migraines. Review of Systems   Constitutional:  Negative for chills and fever. HENT:  Negative for congestion, ear pain and sore throat. Respiratory:  Negative for cough, chest tightness, shortness of breath and wheezing. Cardiovascular:  Negative for chest pain. Gastrointestinal:  Negative for abdominal pain, diarrhea and nausea. Musculoskeletal:  Negative for arthralgias and myalgias. Skin:  Negative for rash. acne   Neurological:  Negative for dizziness and light-headedness.      Past Medical History:   Diagnosis Date    Asthma     Generalized anxiety disorder     Insomnia     Migraines        Current Outpatient Medications   Medication Sig Dispense Refill    ISOtretinoin (ACCUTANE) 40 MG chemo capsule Take 1 capsule by mouth 2 times daily 60 capsule 0    drospirenone-ethinyl estradiol (ELVIS) 3-0.02 MG per tablet Take 1 tablet by mouth daily 1 packet 11    azelastine (ASTELIN) 0.1 % nasal spray 2 sprays by Nasal route 2 times daily      fluticasone (FLONASE) 50 MCG/ACT nasal spray 2 sprays by Nasal route daily      SUMAtriptan (IMITREX) 50 MG tablet Take 1 tablet at onset of headache, may repeat in 1 hour. Max 200mg per day. 9 tablet 0    albuterol sulfate HFA (VENTOLIN HFA) 108 (90 Base) MCG/ACT inhaler Inhale 2 puffs into the lungs 4 times daily as needed for Wheezing 1 Inhaler 5     No current facility-administered medications for this visit. No Known Allergies    Past Surgical History:   Procedure Laterality Date    SINUS SURGERY         Social History     Tobacco Use    Smoking status: Never    Smokeless tobacco: Never   Vaping Use    Vaping Use: Never used   Substance Use Topics    Alcohol use: No    Drug use: No       Family History   Problem Relation Age of Onset    Diabetes Father     Heart Failure Maternal Grandmother     Heart Failure Maternal Grandfather     Heart Failure Paternal Grandmother     Stroke Paternal Grandmother     Diabetes Paternal Grandmother     Heart Failure Paternal Grandfather        /74   Pulse 92   Temp 98.6 °F (37 °C)   Ht 5' 9\" (1.753 m)   Wt 200 lb 4 oz (90.8 kg)   SpO2 98%   BMI 29.57 kg/m²     Physical Exam  Vitals reviewed. Constitutional:       General: She is not in acute distress. Appearance: Normal appearance. She is well-developed. HENT:      Head: Normocephalic. Right Ear: Tympanic membrane and external ear normal.      Left Ear: Tympanic membrane and external ear normal.      Nose: Nose normal.      Mouth/Throat:      Mouth: Mucous membranes are moist.      Pharynx: No oropharyngeal exudate or posterior oropharyngeal erythema. Eyes:      Conjunctiva/sclera: Conjunctivae normal.      Pupils: Pupils are equal, round, and reactive to light. Neck:      Thyroid: No thyromegaly. Vascular: No carotid bruit or JVD. Trachea: No tracheal deviation. Cardiovascular:      Rate and Rhythm: Normal rate and regular rhythm.       Heart sounds: Normal heart sounds. No murmur heard. Pulmonary:      Effort: Pulmonary effort is normal. No respiratory distress. Breath sounds: Normal breath sounds. No wheezing or rhonchi. Musculoskeletal:         General: Normal range of motion. Cervical back: Normal range of motion and neck supple. Lymphadenopathy:      Cervical: No cervical adenopathy. Skin:     General: Skin is warm and dry. Findings: No rash. Neurological:      Mental Status: She is alert. Psychiatric:         Mood and Affect: Mood normal.         Behavior: Behavior normal.         Thought Content: Thought content normal.       ASSESSMENT/PLAN:  1. Acne vulgaris  -Continue management per dermatology. They are managing her Accutane. 2. PCOS (polycystic ovarian syndrome)  -Stable, continue OCP. Refills provided. She may stop metformin since she does not tolerate it well.  -Keep upcoming appointment with GYN for Pap and to discuss further.  - drospirenone-ethinyl estradiol (ELVIS) 3-0.02 MG per tablet; Take 1 tablet by mouth daily  Dispense: 1 packet; Refill: 11    3. Migraine with aura and without status migrainosus, not intractable  -Stable, controlled. No recurrence in almost a year. Continue sumatriptan as needed. Return in about 1 year (around 10/28/2023) for physical, 30 minute visit. Sandrinenisreen Dela Cruz was seen today for established new doctor. Diagnoses and all orders for this visit:    Acne vulgaris    PCOS (polycystic ovarian syndrome)  -     drospirenone-ethinyl estradiol (ELVIS) 3-0.02 MG per tablet; Take 1 tablet by mouth daily    Migraine with aura and without status migrainosus, not intractable    Other orders  -     ISOtretinoin (ACCUTANE) 40 MG chemo capsule;  Take 1 capsule by mouth 2 times daily    Medications Discontinued During This Encounter   Medication Reason    azithromycin (ZITHROMAX) 250 MG tablet Therapy completed    metFORMIN (GLUCOPHAGE) 500 MG tablet Therapy completed    drospirenone-ethinyl estradiol (ELVIS) 3-0.02 MG per tablet REORDER     There are no Patient Instructions on file for this visit. Patient voicesunderstanding and agrees to plans along with risks and benefits of plan. Counseling:  Ailyn Suazo Jena's case, medications and options were discussed in detail. Patient was instructed to call the office if she questionsregarding her treatment. Should her conditions worsen, she should return to office to be reassessed by DESIRE Bean. she Should to go the closest Emergency Department for any emergency. They verbalizedunderstanding the above instructions. Return in about 1 year (around 10/28/2023) for physical, 30 minute visit.

## 2022-11-29 ENCOUNTER — OFFICE VISIT (OUTPATIENT)
Dept: OBGYN CLINIC | Age: 22
End: 2022-11-29

## 2022-11-29 VITALS
HEART RATE: 70 BPM | SYSTOLIC BLOOD PRESSURE: 119 MMHG | DIASTOLIC BLOOD PRESSURE: 82 MMHG | WEIGHT: 196 LBS | BODY MASS INDEX: 29.03 KG/M2 | HEIGHT: 69 IN

## 2022-11-29 DIAGNOSIS — E28.2 PCOS (POLYCYSTIC OVARIAN SYNDROME): ICD-10-CM

## 2022-11-29 DIAGNOSIS — Z12.4 SCREENING FOR CERVICAL CANCER: ICD-10-CM

## 2022-11-29 DIAGNOSIS — Z01.419 WELL WOMAN EXAM WITH ROUTINE GYNECOLOGICAL EXAM: Primary | ICD-10-CM

## 2022-11-29 DIAGNOSIS — R79.89 ELEVATED TESTOSTERONE LEVEL IN FEMALE: ICD-10-CM

## 2022-11-29 DIAGNOSIS — Z12.39 ENCOUNTER FOR SCREENING BREAST EXAMINATION: ICD-10-CM

## 2022-11-29 DIAGNOSIS — N92.6 IRREGULAR MENSTRUAL CYCLE: ICD-10-CM

## 2022-11-29 DIAGNOSIS — Z30.41 ENCOUNTER FOR SURVEILLANCE OF CONTRACEPTIVE PILLS: ICD-10-CM

## 2022-11-29 LAB — TESTOSTERONE TOTAL: 29.3 NG/DL (ref 8.4–48.1)

## 2022-11-29 ASSESSMENT — ENCOUNTER SYMPTOMS
ALLERGIC/IMMUNOLOGIC NEGATIVE: 1
GASTROINTESTINAL NEGATIVE: 1
RESPIRATORY NEGATIVE: 1
CONSTIPATION: 0
DIARRHEA: 0
EYES NEGATIVE: 1

## 2022-11-29 NOTE — PROGRESS NOTES
Spenser Trinh is a 20 y.o. female who presents today for her medical conditions/ complaints as noted below. Spenser Trinh is c/o of Annual Exam        HPI  Pt presents for annual exam and pap smear. Taking Elvis and periods have been regular. Taking Accutane for acne- second round. Did well the first time, about 5 years ago. Was diagnosed wit PCOS about 6 months ago. Unable to tolerate Metformin d/t GI upset. Has a new sexual partner. Mammo:NA  Pap smear:  Contraception:OCP     P:0  Ab:0  Bone density:NA  Colonoscopy:NA  Patient's last menstrual period was 2022 (approximate).     Past Medical History:   Diagnosis Date    Asthma     Generalized anxiety disorder     Insomnia     Migraines      Past Surgical History:   Procedure Laterality Date    SINUS SURGERY       Family History   Problem Relation Age of Onset    Diabetes Father     Heart Failure Maternal Grandmother     Heart Failure Maternal Grandfather     Heart Failure Paternal Grandmother     Stroke Paternal Grandmother     Diabetes Paternal Grandmother     Heart Failure Paternal Grandfather      Social History     Tobacco Use    Smoking status: Never    Smokeless tobacco: Never   Substance Use Topics    Alcohol use: No       Current Outpatient Medications   Medication Sig Dispense Refill    drospirenone-ethinyl estradiol (ELVIS) 3-0.02 MG per tablet Take 1 tablet by mouth daily 1 packet 11    azelastine (ASTELIN) 0.1 % nasal spray 2 sprays by Nasal route 2 times daily      fluticasone (FLONASE) 50 MCG/ACT nasal spray 2 sprays by Nasal route daily      SUMAtriptan (IMITREX) 50 MG tablet Take 1 tablet at onset of headache, may repeat in 1 hour. Max 200mg per day. 9 tablet 0    albuterol sulfate HFA (VENTOLIN HFA) 108 (90 Base) MCG/ACT inhaler Inhale 2 puffs into the lungs 4 times daily as needed for Wheezing 1 Inhaler 5     No current facility-administered medications for this visit.      No Known Allergies  Vitals:    22 1410   BP: 119/82   Pulse: 70     Body mass index is 28.94 kg/m². Review of Systems   Constitutional: Negative. HENT: Negative. Eyes: Negative. Respiratory: Negative. Cardiovascular: Negative. Gastrointestinal: Negative. Negative for constipation and diarrhea. Endocrine: Negative. Genitourinary: Negative. Negative for frequency, menstrual problem and urgency. Musculoskeletal: Negative. Skin: Negative. Allergic/Immunologic: Negative. Neurological: Negative. Hematological: Negative. Psychiatric/Behavioral: Negative. All other systems reviewed and are negative. Physical Exam  Vitals and nursing note reviewed. Constitutional:       Appearance: She is well-developed. HENT:      Head: Normocephalic. Neck:      Thyroid: Thyromegaly present. No thyroid mass. Cardiovascular:      Rate and Rhythm: Normal rate and regular rhythm. Pulmonary:      Effort: Pulmonary effort is normal.      Breath sounds: Normal breath sounds. Chest:   Breasts:     Right: No inverted nipple, mass, nipple discharge or skin change. Left: No inverted nipple, mass, nipple discharge or skin change. Abdominal:      Palpations: Abdomen is soft. There is no mass. Tenderness: There is no abdominal tenderness. Genitourinary:     General: Normal vulva. Vagina: Normal.      Cervix: No cervical motion tenderness. Uterus: Normal. Not enlarged. Adnexa:         Right: No mass or tenderness. Left: No mass or tenderness. Comments: Pap collected  Musculoskeletal:         General: Normal range of motion. Cervical back: Normal range of motion and neck supple. Skin:     General: Skin is warm and dry. Neurological:      Mental Status: She is alert and oriented to person, place, and time.    Psychiatric:         Attention and Perception: Attention normal.         Mood and Affect: Mood normal.         Speech: Speech normal.         Behavior: Behavior normal. Thought Content: Thought content normal.         Cognition and Memory: Cognition normal.         Judgment: Judgment normal.        Diagnosis Orders   1. Well woman exam with routine gynecological exam        2. Screening for cervical cancer  PAP SMEAR      3. Encounter for screening breast examination        4. Irregular menstrual cycle            MEDICATIONS:  No orders of the defined types were placed in this encounter. ORDERS:  Orders Placed This Encounter   Procedures    PAP SMEAR       PLAN:  Pap collected  Discussed continuing OCP  STD testing on pap  Discussed PCOS and tx options, lifestyle changes  Normal thyroid labs and u/s  May desire referral to endocrinology in the future    Patient Instructions   Patient Education       Breast Self-Exam: Care Instructions  Your Care Instructions     A breast self-exam is when you check your breasts for lumps or changes. This regular exam helps you learn how your breasts normally look and feel. Most breast problems or changes are not because of cancer. Breast self-exam is not a substitute for a mammogram. Having regular breast exams by your doctor and regular mammograms improve your chances of finding any problems with your breasts. Some women set a time each month to do a step-by-step breast self-exam. Other women like a less formal system. They might look at their breasts as they brush their teeth, or feel their breasts once in a while in the shower. If you notice a change in your breast, tell your doctor. Follow-up care is a key part of your treatment and safety. Be sure to make and go to all appointments, and call your doctor if you are having problems. It's also a good idea to know your test results and keep a list of the medicines you take. How do you do a breast self-exam?  The best time to examine your breasts is usually one week after your menstrual period begins. Your breasts should not be tender then.  If you do not have periods, you might do your exam on a day of the month that is easy to remember. To examine your breasts:  Remove all your clothes above the waist and lie down. When you are lying down, your breast tissue spreads evenly over your chest wall, which makes it easier to feel all your breast tissue. Use the pads--not the fingertips--of the 3 middle fingers of your left hand to check your right breast. Move your fingers slowly in small coin-sized circles that overlap. Use three levels of pressure to feel of all your breast tissue. Use light pressure to feel the tissue close to the skin surface. Use medium pressure to feel a little deeper. Use firm pressure to feel your tissue close to your breastbone and ribs. Use each pressure level to feel your breast tissue before moving on to the next spot. Check your entire breast, moving up and down as if following a strip from the collarbone to the bra line, and from the armpit to the ribs. Repeat until you have covered the entire breast.  Repeat this procedure for your left breast, using the pads of the 3 middle fingers of your right hand. To examine your breasts while in the shower:  Place one arm over your head and lightly soap your breast on that side. Using the pads of your fingers, gently move your hand over your breast (in the strip pattern described above), feeling carefully for any lumps or changes. Repeat for the other breast.  Have your doctor inspect anything you notice to see if you need further testing. Where can you learn more? Go to https://[facility].Deeplink. org and sign in to your [employer] account. Enter P148 in the [facility] box to learn more about \"Breast Self-Exam: Care Instructions. \"     If you do not have an account, please click on the \"Sign Up Now\" link. Current as of: November 22, 2021               Content Version: 13.4  © 2006-2022 [employer]. Care instructions adapted under license by [facility] ([facility]).  If you have questions about a medical condition or this instruction, always ask your healthcare professional. [address] [address] any warranty or liability for your use of this information.

## 2022-11-29 NOTE — PROGRESS NOTES
Pt presents today for pap smear and breast exam. She states she does not need a refill on her b/c.        Mammo:NA  Pap smear:  Contraception:OCP     P:0  Ab:0  Bone density:NA  Colonoscopy:NA

## 2022-11-29 NOTE — PATIENT INSTRUCTIONS
Patient Education        Breast Self-Exam: Care Instructions  Your Care Instructions     A breast self-exam is when you check your breasts for lumps or changes. This regular exam helps you learn how your breasts normally look and feel. Most breast problems or changes are not because of cancer. Breast self-exam is not a substitute for a mammogram. Having regular breast exams by your doctor and regular mammograms improve your chances of finding any problems with your breasts. Some women set a time each month to do a step-by-step breast self-exam. Other women like a less formal system. They might look at their breasts as they brush their teeth, or feel their breasts once in a while in the shower. If you notice a change in your breast, tell your doctor. Follow-up care is a key part of your treatment and safety. Be sure to make and go to all appointments, and call your doctor if you are having problems. It's also a good idea to know your test results and keep a list of the medicines you take. How do you do a breast self-exam?  The best time to examine your breasts is usually one week after your menstrual period begins. Your breasts should not be tender then. If you do not have periods, you might do your exam on a day of the month that is easy to remember. To examine your breasts:  Remove all your clothes above the waist and lie down. When you are lying down, your breast tissue spreads evenly over your chest wall, which makes it easier to feel all your breast tissue. Use the pads--not the fingertips--of the 3 middle fingers of your left hand to check your right breast. Move your fingers slowly in small coin-sized circles that overlap. Use three levels of pressure to feel of all your breast tissue. Use light pressure to feel the tissue close to the skin surface. Use medium pressure to feel a little deeper. Use firm pressure to feel your tissue close to your breastbone and ribs.  Use each pressure level to feel your breast tissue before moving on to the next spot. Check your entire breast, moving up and down as if following a strip from the collarbone to the bra line, and from the armpit to the ribs. Repeat until you have covered the entire breast.  Repeat this procedure for your left breast, using the pads of the 3 middle fingers of your right hand. To examine your breasts while in the shower:  Place one arm over your head and lightly soap your breast on that side. Using the pads of your fingers, gently move your hand over your breast (in the strip pattern described above), feeling carefully for any lumps or changes. Repeat for the other breast.  Have your doctor inspect anything you notice to see if you need further testing. Where can you learn more? Go to [email or web address]://[email or web address]. org and sign in to your [email or web address] account. Enter P148 in the [place] capital online science and technology box to learn more about \"Breast Self-Exam: Care Instructions. \"     If you do not have an account, please click on the \"Sign Up Now\" link. Current as of: November 22, 2021               Content Version: 13.4  © 1421-0763 [employer]orporated. Care instructions adapted under license by [facility] ([facility]). If you have questions about a medical condition or this instruction, always ask your healthcare professional. [address]  any warranty or liability for your use of this information.

## 2022-12-01 LAB
C. TRACHOMATIS DNA,THIN PREP: NOT DETECTED
N. GONORRHOEAE DNA, THIN PREP: NOT DETECTED
TRICHOMONAS VAGINALIS DNA: NOT DETECTED

## 2022-12-13 ENCOUNTER — OFFICE VISIT (OUTPATIENT)
Age: 22
End: 2022-12-13
Payer: COMMERCIAL

## 2022-12-13 VITALS
RESPIRATION RATE: 20 BRPM | TEMPERATURE: 98 F | BODY MASS INDEX: 29.47 KG/M2 | HEIGHT: 69 IN | OXYGEN SATURATION: 99 % | HEART RATE: 81 BPM | DIASTOLIC BLOOD PRESSURE: 60 MMHG | SYSTOLIC BLOOD PRESSURE: 122 MMHG | WEIGHT: 199 LBS

## 2022-12-13 DIAGNOSIS — H65.01 RIGHT ACUTE SEROUS OTITIS MEDIA, RECURRENCE NOT SPECIFIED: ICD-10-CM

## 2022-12-13 DIAGNOSIS — J02.9 SORE THROAT: Primary | ICD-10-CM

## 2022-12-13 DIAGNOSIS — R05.9 COUGH, UNSPECIFIED TYPE: ICD-10-CM

## 2022-12-13 LAB
INFLUENZA A ANTIBODY: NORMAL
INFLUENZA B ANTIBODY: NORMAL
S PYO AG THROAT QL: NORMAL

## 2022-12-13 PROCEDURE — 99213 OFFICE O/P EST LOW 20 MIN: CPT | Performed by: NURSE PRACTITIONER

## 2022-12-13 PROCEDURE — 87804 INFLUENZA ASSAY W/OPTIC: CPT | Performed by: NURSE PRACTITIONER

## 2022-12-13 PROCEDURE — 87880 STREP A ASSAY W/OPTIC: CPT | Performed by: NURSE PRACTITIONER

## 2022-12-13 RX ORDER — METHYLPREDNISOLONE 4 MG/1
TABLET ORAL
Qty: 1 KIT | Refills: 0 | Status: SHIPPED | OUTPATIENT
Start: 2022-12-13

## 2022-12-13 RX ORDER — AMOXICILLIN AND CLAVULANATE POTASSIUM 875; 125 MG/1; MG/1
1 TABLET, FILM COATED ORAL 2 TIMES DAILY
Qty: 20 TABLET | Refills: 0 | Status: SHIPPED | OUTPATIENT
Start: 2022-12-13 | End: 2022-12-23

## 2022-12-13 ASSESSMENT — ENCOUNTER SYMPTOMS
SINUS PRESSURE: 1
DIARRHEA: 0
SINUS PAIN: 1
ALLERGIC/IMMUNOLOGIC NEGATIVE: 1
NAUSEA: 0
ABDOMINAL PAIN: 0
SHORTNESS OF BREATH: 0
COUGH: 1
SORE THROAT: 1
EYES NEGATIVE: 1
VOMITING: 0

## 2022-12-13 ASSESSMENT — VISUAL ACUITY: OU: 1

## 2022-12-13 NOTE — PATIENT INSTRUCTIONS
Plenty of fluids  Rest  OTC Tylenol or Motrin as needed   OTC Claritin as directed   OTC Flonase as directed- 2 sprays each nostril at bedtime  Augmentin as directed  Medrol dosepack as directed  Follow up with PCP or return to Urgent Care for worsening or unresolved symptoms.

## 2022-12-13 NOTE — PROGRESS NOTES
[id number]  [address] [address] [place] [address]  Dept: [phone]  Dept Fax: [phone]  Loc: [phone]    Rosanna Ordaz is a 20 y.o. female who presents today for her medical conditions/complaintsas noted below. Rosanna Ordaz is c/o of Pharyngitis, Facial Pain, Congestion, Ear Fullness, and Cough        HPI:     Pharyngitis  This is a new problem. The current episode started in the past 7 days (2 days). The problem occurs constantly. The problem has been unchanged. Associated symptoms include anorexia, congestion, coughing, headaches and a sore throat. Pertinent negatives include no abdominal pain, arthralgias, chills, fever, myalgias, nausea, rash or vomiting. Associated symptoms comments: Ear fullness  Facial pressure. Nothing aggravates the symptoms. She has tried nothing for the symptoms. The treatment provided mild relief. She tells me she gets sinus infections often and she has had sinus surgery and this has helped but she still gets them at times. Her appetite has not been as much as normal but she has been eating.    Past Medical History:   Diagnosis Date    Asthma     Generalized anxiety disorder     Insomnia     Migraines      Past Surgical History:   Procedure Laterality Date    SINUS SURGERY         Family History   Problem Relation Age of Onset    Diabetes Father     Heart Failure Maternal Grandmother     Heart Failure Maternal Grandfather     Heart Failure Paternal Grandmother     Stroke Paternal Grandmother     Diabetes Paternal Grandmother     Heart Failure Paternal Grandfather        Social History     Tobacco Use    Smoking status: Never    Smokeless tobacco: Never   Substance Use Topics    Alcohol use: No      Current Outpatient Medications   Medication Sig Dispense Refill    amoxicillin-clavulanate (AUGMENTIN) 875-125 MG per tablet Take 1 tablet by mouth 2 times daily for 10 days 20 tablet 0    methylPREDNISolone (MEDROL DOSEPACK) 4 MG tablet Take by mouth. 1 kit 0    drospirenone-ethinyl estradiol (ELVIS) 3-0.02 MG per tablet Take 1 tablet by mouth daily 1 packet 11    azelastine (ASTELIN) 0.1 % nasal spray 2 sprays by Nasal route 2 times daily      fluticasone (FLONASE) 50 MCG/ACT nasal spray 2 sprays by Nasal route daily      SUMAtriptan (IMITREX) 50 MG tablet Take 1 tablet at onset of headache, may repeat in 1 hour. Max 200mg per day. 9 tablet 0    albuterol sulfate HFA (VENTOLIN HFA) 108 (90 Base) MCG/ACT inhaler Inhale 2 puffs into the lungs 4 times daily as needed for Wheezing 1 Inhaler 5     No current facility-administered medications for this visit. No Known Allergies    Health Maintenance   Topic Date Due    Varicella vaccine (1 of 2 - 2-dose childhood series) Never done    Pneumococcal 0-64 years Vaccine (1 - PCV) Never done    HPV vaccine (1 - 2-dose series) Never done    HIV screen  Never done    Hepatitis C screen  Never done    Chlamydia/GC screen  12/29/2021    COVID-19 Vaccine (4 - Booster for Pfizer series) 02/16/2022    Flu vaccine (1) Never done    Depression Screen  10/28/2023    DTaP/Tdap/Td vaccine (3 - Td or Tdap) 11/03/2025    Pap smear  11/29/2025    Meningococcal (ACWY) vaccine  Completed    Hepatitis A vaccine  Aged Out    Hib vaccine  Aged Out       Subjective:     Review of Systems   Constitutional:  Positive for appetite change. Negative for activity change, chills and fever. HENT:  Positive for congestion, ear pain, sinus pressure, sinus pain and sore throat. Negative for ear discharge. Ear fullness   Eyes: Negative. Respiratory:  Positive for cough. Negative for shortness of breath. Cardiovascular: Negative. Gastrointestinal:  Positive for anorexia. Negative for abdominal pain, diarrhea, nausea and vomiting. Musculoskeletal:  Negative for arthralgias and myalgias. Skin:  Negative for rash. Allergic/Immunologic: Negative. Neurological:  Positive for headaches. :Objective      Physical Exam  Vitals and nursing note reviewed. Constitutional:       General: She is awake. She is not in acute distress. Appearance: Normal appearance. She is well-developed and well-groomed. She is obese. She is not ill-appearing. HENT:      Head: Normocephalic. Right Ear: Hearing, ear canal and external ear normal. A middle ear effusion is present. Tympanic membrane is bulging. Left Ear: Hearing, tympanic membrane, ear canal and external ear normal.      Ears:      Comments: Serous fluid behind right TM, poor cone of light     Nose: Congestion present. Right Sinus: Frontal sinus tenderness present. Left Sinus: Frontal sinus tenderness present. Mouth/Throat:      Lips: Pink. Mouth: Mucous membranes are moist.      Pharynx: Oropharynx is clear. Uvula midline. Posterior oropharyngeal erythema present. Tonsils: 0 on the right. 0 on the left. Comments: Erythema posterior pharynx  Eyes:      General: Lids are normal. Vision grossly intact. Conjunctiva/sclera: Conjunctivae normal.   Neck:      Trachea: Phonation normal.   Cardiovascular:      Rate and Rhythm: Normal rate and regular rhythm. Heart sounds: Normal heart sounds, S1 normal and S2 normal. No murmur heard. No friction rub. No gallop. Pulmonary:      Effort: Pulmonary effort is normal. No respiratory distress. Breath sounds: Normal breath sounds and air entry. No wheezing, rhonchi or rales. Abdominal:      Palpations: Abdomen is soft. Musculoskeletal:         General: No tenderness or deformity. Normal range of motion. Cervical back: Full passive range of motion without pain, normal range of motion and neck supple. Lymphadenopathy:      Head:      Right side of head: Tonsillar adenopathy present. Left side of head: Tonsillar adenopathy present. Skin:     General: Skin is warm and dry. Capillary Refill: Capillary refill takes less than 2 seconds. Neurological:      General: No focal deficit present. Mental Status: She is alert, oriented to person, place, and time and easily aroused. Mental status is at baseline. Psychiatric:         Attention and Perception: Attention normal.         Mood and Affect: Mood normal.         Speech: Speech normal.         Behavior: Behavior normal. Behavior is cooperative. /60   Pulse 81   Temp 98 °F (36.7 °C)   Resp 20   Ht 5' 9\" (1.753 m)   Wt 199 lb (90.3 kg)   LMP 11/13/2022 (Approximate)   SpO2 99%   BMI 29.39 kg/m²     :Assessment       Diagnosis Orders   1. Sore throat  POCT rapid strep A      2. Cough, unspecified type        3. Right acute serous otitis media, recurrence not specified  amoxicillin-clavulanate (AUGMENTIN) 875-125 MG per tablet    methylPREDNISolone (MEDROL DOSEPACK) 4 MG tablet          :Plan      Orders Placed This Encounter   Procedures    POCT rapid strep A    POCT Influenza A/B     Results for orders placed or performed in visit on 12/13/22   POCT rapid strep A   Result Value Ref Range    Strep A Ag None Detected None Detected   POCT Influenza A/B   Result Value Ref Range    Influenza A Ab neg     Influenza B Ab neg           Plenty of fluids  Rest  OTC Tylenol or Motrin as needed   OTC Claritin as directed   OTC Flonase as directed- 2 sprays each nostril at bedtime  Augmentin as directed  Medrol dosepack as directed  Follow up with PCP or return to Urgent Care for worsening or unresolved symptoms. No follow-ups on file. Orders Placed This Encounter   Medications    amoxicillin-clavulanate (AUGMENTIN) 875-125 MG per tablet     Sig: Take 1 tablet by mouth 2 times daily for 10 days     Dispense:  20 tablet     Refill:  0    methylPREDNISolone (MEDROL DOSEPACK) 4 MG tablet     Sig: Take by mouth.      Dispense:  1 kit     Refill:  0          Patient Instructions   Plenty of fluids  Rest  OTC Tylenol or Motrin as needed   OTC Claritin as directed   OTC Flonase as directed- 2 sprays each nostril at bedtime  Augmentin as directed  Medrol dosepack as directed  Follow up with PCP or return to Urgent Care for worsening or unresolved symptoms. Patient given educational materials- see patient instructions. Discussed use, benefit, and side effects of prescribedmedications. All patient questions answered. Pt voiced understanding.        Electronically signed by DESIRE Noble CNP on 12/13/2022 at 4:15 PM

## 2023-05-08 ENCOUNTER — OFFICE VISIT (OUTPATIENT)
Age: 23
End: 2023-05-08
Payer: COMMERCIAL

## 2023-05-08 VITALS
RESPIRATION RATE: 20 BRPM | SYSTOLIC BLOOD PRESSURE: 122 MMHG | WEIGHT: 188 LBS | BODY MASS INDEX: 27.85 KG/M2 | DIASTOLIC BLOOD PRESSURE: 78 MMHG | TEMPERATURE: 98.9 F | HEART RATE: 100 BPM | OXYGEN SATURATION: 96 % | HEIGHT: 69 IN

## 2023-05-08 DIAGNOSIS — B34.9 VIRAL ILLNESS: Primary | ICD-10-CM

## 2023-05-08 PROCEDURE — 99213 OFFICE O/P EST LOW 20 MIN: CPT | Performed by: NURSE PRACTITIONER

## 2023-05-08 RX ORDER — ISOTRETINOIN 20 MG/1
CAPSULE ORAL
COMMUNITY

## 2023-05-08 RX ORDER — ONDANSETRON 4 MG/1
4 TABLET, ORALLY DISINTEGRATING ORAL 3 TIMES DAILY PRN
Qty: 21 TABLET | Refills: 0 | Status: SHIPPED | OUTPATIENT
Start: 2023-05-08

## 2023-05-08 ASSESSMENT — ENCOUNTER SYMPTOMS
EYE PAIN: 0
SORE THROAT: 0
EYE DISCHARGE: 0
STRIDOR: 0
WHEEZING: 0
TROUBLE SWALLOWING: 0
ABDOMINAL PAIN: 0
SINUS PRESSURE: 0
COLOR CHANGE: 0
SHORTNESS OF BREATH: 0
ABDOMINAL DISTENTION: 0
COUGH: 0
DIARRHEA: 1
CHEST TIGHTNESS: 0

## 2023-05-08 NOTE — PATIENT INSTRUCTIONS
BRAT diet, increase fluids  Advised against immodium  Zofran sent  If symptoms continue to Thursday call PCP. 5.  If symptoms become worrisome go to ER    Patient verbalized understanding and agrees.

## 2023-05-08 NOTE — PROGRESS NOTES
times daily      fluticasone (FLONASE) 50 MCG/ACT nasal spray 2 sprays by Nasal route daily      SUMAtriptan (IMITREX) 50 MG tablet Take 1 tablet at onset of headache, may repeat in 1 hour. Max 200mg per day. 9 tablet 0    albuterol sulfate HFA (VENTOLIN HFA) 108 (90 Base) MCG/ACT inhaler Inhale 2 puffs into the lungs 4 times daily as needed for Wheezing 1 Inhaler 5    ISOtretinoin (ACCUTANE) 20 MG chemo capsule       methylPREDNISolone (MEDROL DOSEPACK) 4 MG tablet Take by mouth. (Patient not taking: Reported on 5/8/2023) 1 kit 0     No current facility-administered medications for this visit. No Known Allergies    Health Maintenance   Topic Date Due    Varicella vaccine (1 of 2 - 2-dose childhood series) Never done    Pneumococcal 0-64 years Vaccine (1 - PCV) Never done    HPV vaccine (1 - 2-dose series) Never done    HIV screen  Never done    Hepatitis C screen  Never done    Chlamydia/GC screen  12/29/2021    COVID-19 Vaccine (4 - Booster for Pfizer series) 02/16/2022    Flu vaccine (Season Ended) 08/01/2023    Depression Screen  10/28/2023    DTaP/Tdap/Td vaccine (3 - Td or Tdap) 11/03/2025    Pap smear  11/29/2025    Meningococcal (ACWY) vaccine  Completed    Hepatitis A vaccine  Aged Out    Hib vaccine  Aged Out       Subjective:   Review of Systems   Constitutional:  Positive for chills. Negative for fatigue and fever. HENT:  Negative for congestion, sinus pressure, sore throat and trouble swallowing. Eyes:  Negative for pain and discharge. Respiratory:  Negative for cough, chest tightness, shortness of breath, wheezing and stridor. Cardiovascular:  Negative for chest pain and palpitations. Gastrointestinal:  Positive for diarrhea. Negative for abdominal distention and abdominal pain. Genitourinary:  Negative for difficulty urinating, dysuria and hematuria. Musculoskeletal:  Negative for arthralgias, neck pain and neck stiffness. Skin:  Negative for color change and rash.

## 2023-11-30 ENCOUNTER — TELEPHONE (OUTPATIENT)
Dept: OBGYN CLINIC | Age: 23
End: 2023-11-30

## 2023-11-30 NOTE — TELEPHONE ENCOUNTER
Pt called to cancel her visit and let us know [place] , I asked to update her address, in case of any other billing information etc, she said it wasn't needed. I wanted to let you know, to shut any further notifications, letters sent etc Thanks !

## 2024-04-05 ENCOUNTER — OFFICE VISIT (OUTPATIENT)
Age: 24
End: 2024-04-05
Payer: COMMERCIAL

## 2024-04-05 VITALS
WEIGHT: 205 LBS | SYSTOLIC BLOOD PRESSURE: 116 MMHG | HEART RATE: 86 BPM | TEMPERATURE: 98.1 F | RESPIRATION RATE: 20 BRPM | DIASTOLIC BLOOD PRESSURE: 76 MMHG | OXYGEN SATURATION: 98 % | BODY MASS INDEX: 30.27 KG/M2

## 2024-04-05 DIAGNOSIS — R05.1 ACUTE COUGH: ICD-10-CM

## 2024-04-05 DIAGNOSIS — J10.1 INFLUENZA B: Primary | ICD-10-CM

## 2024-04-05 DIAGNOSIS — J02.9 SORE THROAT: ICD-10-CM

## 2024-04-05 LAB
INFLUENZA A ANTIBODY: ABNORMAL
INFLUENZA B ANTIBODY: ABNORMAL
S PYO AG THROAT QL: NORMAL

## 2024-04-05 PROCEDURE — 87880 STREP A ASSAY W/OPTIC: CPT

## 2024-04-05 PROCEDURE — 99213 OFFICE O/P EST LOW 20 MIN: CPT

## 2024-04-05 PROCEDURE — 87804 INFLUENZA ASSAY W/OPTIC: CPT

## 2024-04-05 RX ORDER — BENZONATATE 200 MG/1
200 CAPSULE ORAL 3 TIMES DAILY PRN
Qty: 30 CAPSULE | Refills: 0 | Status: SHIPPED | OUTPATIENT
Start: 2024-04-05 | End: 2024-04-15

## 2024-04-05 RX ORDER — FLUOXETINE HYDROCHLORIDE 20 MG/1
20 CAPSULE ORAL DAILY
COMMUNITY
Start: 2024-04-01

## 2024-04-05 RX ORDER — TRAZODONE HYDROCHLORIDE 50 MG/1
50 TABLET ORAL NIGHTLY
COMMUNITY
Start: 2024-02-13

## 2024-04-05 ASSESSMENT — ENCOUNTER SYMPTOMS
ABDOMINAL PAIN: 0
COUGH: 1
SORE THROAT: 0
DIARRHEA: 0
WHEEZING: 0
ABDOMINAL DISTENTION: 0
VOMITING: 0

## 2024-04-05 NOTE — PROGRESS NOTES
[facility]  [facility]  [address] [place] [address] [address]IVE  [facility] [address]  Dept: [phone]  Dept Fax: [phone]  Loc: [phone]    Aliyah Bella is a 24 y.o. female who presents today for her medical conditions/complaints as noted below.  Aliyah Bella is c/o of Nasal Congestion, Cough, Headache, Generalized Body Aches, and Fever        HPI:     Aliyah Bella presents with complaints of nasal congestion, cough, headache, body aches and fever. Denies any fever. Diarrhea or vomiting.   Symptoms began  3-4 days ago. OTC treatment includes Tylenol Cough and Flu. No recent antibiotic or steroid administration. No known sick contact.      Past Medical History:   Diagnosis Date    Asthma     Generalized anxiety disorder     Insomnia     Migraines      Past Surgical History:   Procedure Laterality Date    SINUS SURGERY         Family History   Problem Relation Age of Onset    Diabetes Father     Heart Failure Maternal Grandmother     Heart Failure Maternal Grandfather     Heart Failure Paternal Grandmother     Stroke Paternal Grandmother     Diabetes Paternal Grandmother     Heart Failure Paternal Grandfather        Social History     Tobacco Use    Smoking status: Never    Smokeless tobacco: Never   Substance Use Topics    Alcohol use: No      Current Outpatient Medications   Medication Sig Dispense Refill    FLUoxetine (PROZAC) 20 MG capsule Take 1 capsule by mouth daily      traZODone (DESYREL) 50 MG tablet Take 1 tablet by mouth nightly      benzonatate (TESSALON) 200 MG capsule Take 1 capsule by mouth 3 times daily as needed for Cough 30 capsule 0    drospirenone-ethinyl estradiol (ELVIS) 3-0.02 MG per tablet Take 1 tablet by mouth daily 1 packet 11    SUMAtriptan (IMITREX) 50 MG tablet Take 1 tablet at onset of headache, may repeat in 1 hour.  Max 200mg per day. 9 tablet 0    ISOtretinoin (ACCUTANE) 20 MG chemo capsule  (Patient not taking: Reported on 4/5/2024)
